# Patient Record
Sex: FEMALE | Race: WHITE | NOT HISPANIC OR LATINO | Employment: FULL TIME | URBAN - METROPOLITAN AREA
[De-identification: names, ages, dates, MRNs, and addresses within clinical notes are randomized per-mention and may not be internally consistent; named-entity substitution may affect disease eponyms.]

---

## 2024-03-05 ENCOUNTER — LAB REQUISITION (OUTPATIENT)
Dept: LAB | Facility: CLINIC | Age: 29
End: 2024-03-05

## 2024-03-05 DIAGNOSIS — Z12.4 ENCOUNTER FOR SCREENING FOR MALIGNANT NEOPLASM OF CERVIX: ICD-10-CM

## 2024-03-05 PROCEDURE — G0145 SCR C/V CYTO,THINLAYER,RESCR: HCPCS | Performed by: OBSTETRICS & GYNECOLOGY

## 2024-03-05 PROCEDURE — 87624 HPV HI-RISK TYP POOLED RSLT: CPT | Performed by: OBSTETRICS & GYNECOLOGY

## 2024-03-07 ENCOUNTER — HOSPITAL ENCOUNTER (OUTPATIENT)
Facility: AMBULATORY SURGERY CENTER | Age: 29
End: 2024-03-07
Attending: OBSTETRICS & GYNECOLOGY
Payer: COMMERCIAL

## 2024-03-07 LAB
BKR LAB AP GYN ADEQUACY: NORMAL
BKR LAB AP GYN INTERPRETATION: NORMAL
BKR LAB AP HPV REFLEX: NORMAL
BKR LAB AP LMP: NORMAL
BKR LAB AP PREVIOUS ABNL DX: NORMAL
BKR LAB AP PREVIOUS ABNORMAL: NORMAL
PATH REPORT.COMMENTS IMP SPEC: NORMAL
PATH REPORT.COMMENTS IMP SPEC: NORMAL
PATH REPORT.RELEVANT HX SPEC: NORMAL

## 2024-03-11 LAB
HUMAN PAPILLOMA VIRUS 16 DNA: NEGATIVE
HUMAN PAPILLOMA VIRUS 18 DNA: NEGATIVE
HUMAN PAPILLOMA VIRUS FINAL DIAGNOSIS: NORMAL
HUMAN PAPILLOMA VIRUS OTHER HR: NEGATIVE

## 2024-03-13 RX ORDER — ACETAMINOPHEN 325 MG/1
975 TABLET ORAL ONCE
Status: CANCELLED | OUTPATIENT
Start: 2024-03-13 | End: 2024-03-13

## 2024-04-30 RX ORDER — LIDOCAINE 40 MG/G
CREAM TOPICAL
Status: CANCELLED | OUTPATIENT
Start: 2024-04-30

## 2024-04-30 RX ORDER — SODIUM CHLORIDE, SODIUM LACTATE, POTASSIUM CHLORIDE, CALCIUM CHLORIDE 600; 310; 30; 20 MG/100ML; MG/100ML; MG/100ML; MG/100ML
INJECTION, SOLUTION INTRAVENOUS CONTINUOUS
Status: CANCELLED | OUTPATIENT
Start: 2024-04-30

## 2024-04-30 RX ORDER — MAGNESIUM SULFATE HEPTAHYDRATE 40 MG/ML
4 INJECTION, SOLUTION INTRAVENOUS ONCE
Status: CANCELLED | OUTPATIENT
Start: 2024-04-30 | End: 2024-04-30

## 2024-04-30 RX ORDER — ACETAMINOPHEN 325 MG/1
975 TABLET ORAL ONCE
Status: CANCELLED | OUTPATIENT
Start: 2024-04-30 | End: 2024-04-30

## 2024-05-01 ENCOUNTER — HOSPITAL ENCOUNTER (OUTPATIENT)
Dept: ULTRASOUND IMAGING | Facility: HOSPITAL | Age: 29
Discharge: HOME | End: 2024-05-01
Payer: COMMERCIAL

## 2024-05-01 ENCOUNTER — OFFICE VISIT (OUTPATIENT)
Dept: FAMILY MEDICINE | Facility: CLINIC | Age: 29
End: 2024-05-01
Payer: COMMERCIAL

## 2024-05-01 VITALS
HEART RATE: 71 BPM | OXYGEN SATURATION: 97 % | DIASTOLIC BLOOD PRESSURE: 70 MMHG | SYSTOLIC BLOOD PRESSURE: 118 MMHG | WEIGHT: 158.2 LBS | RESPIRATION RATE: 16 BRPM | TEMPERATURE: 99.5 F | HEIGHT: 65 IN | BODY MASS INDEX: 26.36 KG/M2

## 2024-05-01 DIAGNOSIS — E05.90 SUBCLINICAL HYPERTHYROIDISM: ICD-10-CM

## 2024-05-01 DIAGNOSIS — Z01.818 PREOP GENERAL PHYSICAL EXAM: Primary | ICD-10-CM

## 2024-05-01 DIAGNOSIS — R22.1 MASS OF THYROID REGION: ICD-10-CM

## 2024-05-01 LAB
ERYTHROCYTE [DISTWIDTH] IN BLOOD BY AUTOMATED COUNT: 11.2 % (ref 10–15)
HCT VFR BLD AUTO: 39.7 % (ref 35–47)
HGB BLD-MCNC: 14.2 G/DL (ref 11.7–15.7)
MCH RBC QN AUTO: 31.7 PG (ref 26.5–33)
MCHC RBC AUTO-ENTMCNC: 35.8 G/DL (ref 31.5–36.5)
MCV RBC AUTO: 89 FL (ref 78–100)
PLATELET # BLD AUTO: 276 10E3/UL (ref 150–450)
RBC # BLD AUTO: 4.48 10E6/UL (ref 3.8–5.2)
WBC # BLD AUTO: 5.7 10E3/UL (ref 4–11)

## 2024-05-01 PROCEDURE — 76536 US EXAM OF HEAD AND NECK: CPT

## 2024-05-01 PROCEDURE — 80048 BASIC METABOLIC PNL TOTAL CA: CPT

## 2024-05-01 PROCEDURE — 99204 OFFICE O/P NEW MOD 45 MIN: CPT

## 2024-05-01 PROCEDURE — 84443 ASSAY THYROID STIM HORMONE: CPT

## 2024-05-01 PROCEDURE — 84480 ASSAY TRIIODOTHYRONINE (T3): CPT

## 2024-05-01 PROCEDURE — 84439 ASSAY OF FREE THYROXINE: CPT

## 2024-05-01 PROCEDURE — 85027 COMPLETE CBC AUTOMATED: CPT

## 2024-05-01 PROCEDURE — 36415 COLL VENOUS BLD VENIPUNCTURE: CPT

## 2024-05-01 RX ORDER — CETIRIZINE HYDROCHLORIDE 5 MG/1
10 TABLET ORAL DAILY
COMMUNITY

## 2024-05-01 ASSESSMENT — PAIN SCALES - GENERAL: PAINLEVEL: NO PAIN (0)

## 2024-05-01 NOTE — PROGRESS NOTES
Preoperative Evaluation  [address] [address]  Phone: [phone]  Fax: [phone]  Primary Provider: No Ref-Primary, Physician  Pre-op Performing Provider: JULY SHIELDS  May 1, 2024     Kezia is a 29 year old, presenting for the following:  Pre-Op Exam (Pre-op exam today )        5/1/2024    11:10 AM   Additional Questions   Roomed by María Elena NGO MA   Accompanied by Self     Surgical Information  Surgery/Procedure: laparoscopic Bilateral Salpingectomy   Surgery Location: [facility]   Surgeon: Dr. Janay Plascencia   Surgery Date: 5/3/24  Time of Surgery: 7 am    Where patient plans to recover: At home with family  Fax number for surgical facility: Print copy for patient to bring to surgery. Patient was not given a fax number to where it needs to be faxed.     Assessment & Plan     The proposed surgical procedure is considered INTERMEDIATE risk.    Preop general physical exam  On exam, pleasant 29 year old patient. No known medical history. No findings on today's physical exam that would make me recommend holding off on the procedure. Vital signs at goal. Having bilateral salpingectomy. Has no questions regarding the procedure. Will check labs today. CBC and BMP without concern. TSH of 0.03, recommend holding off on procedure until euthyroid state.   - CBC with platelets  - Basic metabolic panel  (Ca, Cl, CO2, Creat, Gluc, K, Na, BUN)    Mass of thyroid region  On exam, there is a palpable mass to the left neck, in the thyroid region. Non-tender. Soft. Patient has not noticed this before. We will check TSH and get a US. US back with left thyroid mass that meets criteria for FNA. Ordered and notified patient. TSH came back at 0.03. Not approved for surgery at this time until further work-up.   - US Thyroid; Future  - TSH with free T4 reflex  - US Biopsy Thyroid Fine Needle Aspiration; Future     - No identified additional risk  factors other than previously addressed    Antiplatelet or Anticoagulation Medication Instructions   - Patient is on no antiplatelet or anticoagulation medications.    Additional Medication Instructions  Hold Cetrizine until after the procedure.    Recommendation  Not approved for surgery. Recommend getting patient to euthyroid state prior to anesthesia.     Subjective     HPI related to upcoming procedure: Does not want to have any children, salpingectomy has been discussed as contraceptive option which patient would like to do.  Has met with OBGYN regarding procedure.          5/1/2024    11:08 AM   Preop Questions   1. Have you ever had a heart attack or stroke? No   2. Have you ever had surgery on your heart or blood vessels, such as a stent placement, a coronary artery bypass, or surgery on an artery in your head, neck, heart, or legs? No   3. Do you have chest pain with activity? No   4. Do you have a history of  heart failure? No   5. Do you currently have a cold, bronchitis or symptoms of other infection? No   6. Do you have a cough, shortness of breath, or wheezing? No   7. Do you or anyone in your family have previous history of blood clots? No   8. Do you or does anyone in your family have a serious bleeding problem such as prolonged bleeding following surgeries or cuts? No   9. Have you ever had problems with anemia or been told to take iron pills? No   10. Have you had any abnormal blood loss such as black, tarry or bloody stools, or abnormal vaginal bleeding? No   11. Have you ever had a blood transfusion? No   12. Are you willing to have a blood transfusion if it is medically needed before, during, or after your surgery? Yes   13. Have you or any of your relatives ever had problems with anesthesia? No   14. Do you have sleep apnea, excessive snoring or daytime drowsiness? No   15. Do you have any artifical heart valves or other implanted medical devices like a pacemaker, defibrillator, or continuous  glucose monitor? No   16. Do you have artificial joints? No   17. Are you allergic to latex? No   18. Is there any chance that you may be pregnant? No       Health Care Directive  Patient does not have a Health Care Directive or Living Will: Discussed advance care planning with patient; however, patient declined at this time.    Preoperative Review of    reviewed - no record of controlled substances prescribed.      There are no problems to display for this patient.     No past medical history on file.  No past surgical history on file.  Current Outpatient Medications   Medication Sig Dispense Refill    cetirizine (ZYRTEC) 5 MG tablet Take 10 mg by mouth daily         Allergies   Allergen Reactions    Aspirin Other (See Comments)     stiffiness    Ibuprofen Other (See Comments)     stuffy        Social History     Tobacco Use    Smoking status: Never    Smokeless tobacco: Never   Substance Use Topics    Alcohol use: Not on file     Family History   Problem Relation Age of Onset    Anesthesia Reaction No family hx of     Thrombosis No family hx of      History   Drug Use Not on file         Review of Systems    Review of Systems  CONSTITUTIONAL: NEGATIVE for fever, chills, change in weight  INTEGUMENTARY/SKIN: NEGATIVE for worrisome rashes, moles or lesions  EYES: NEGATIVE for vision changes or irritation  ENT/MOUTH: NEGATIVE for ear, mouth and throat problems  RESP: NEGATIVE for significant cough or SOB  CV: NEGATIVE for chest pain, palpitations or peripheral edema  GI: NEGATIVE for nausea, abdominal pain, heartburn, or change in bowel habits  : NEGATIVE for frequency, dysuria, or hematuria  MUSCULOSKELETAL: NEGATIVE for significant arthralgias or myalgia  NEURO: NEGATIVE for weakness, dizziness or paresthesias  ENDOCRINE: NEGATIVE for temperature intolerance, skin/hair changes  HEME: NEGATIVE for bleeding problems  PSYCHIATRIC: NEGATIVE for changes in mood or affect    Objective    /70 (BP Location:  "Right arm, Patient Position: Sitting, Cuff Size: Adult Regular)   Pulse 71   Temp 99.5  F (37.5  C) (Oral)   Resp 16   Ht 1.651 m (5' 5\")   Wt 71.8 kg (158 lb 3.2 oz)   LMP  (LMP Unknown)   SpO2 97%   BMI 26.33 kg/m     Estimated body mass index is 26.33 kg/m  as calculated from the following:    Height as of this encounter: 1.651 m (5' 5\").    Weight as of this encounter: 71.8 kg (158 lb 3.2 oz).  Physical Exam  Constitutional:       General: She is not in acute distress.  HENT:      Right Ear: Tympanic membrane, ear canal and external ear normal.      Left Ear: Tympanic membrane, ear canal and external ear normal.   Eyes:      Extraocular Movements: Extraocular movements intact.      Pupils: Pupils are equal, round, and reactive to light.   Neck:      Thyroid: Thyroid mass present.     Cardiovascular:      Rate and Rhythm: Normal rate and regular rhythm.      Pulses: Normal pulses.      Heart sounds: Normal heart sounds. No murmur heard.  Pulmonary:      Effort: Pulmonary effort is normal. No respiratory distress.      Breath sounds: No wheezing.   Abdominal:      General: Abdomen is flat. Bowel sounds are normal. There is no distension.   Musculoskeletal:         General: Normal range of motion.      Cervical back: Normal range of motion. No rigidity.      Right lower leg: No edema.      Left lower leg: No edema.   Lymphadenopathy:      Cervical: No cervical adenopathy.   Neurological:      General: No focal deficit present.      Mental Status: She is alert.      Cranial Nerves: No cranial nerve deficit.      Sensory: No sensory deficit.      Motor: No weakness.      Gait: Gait normal.   Psychiatric:         Mood and Affect: Mood normal.         Thought Content: Thought content normal.         Diagnostics  Recent Results (from the past 24 hour(s))   CBC with platelets    Collection Time: 05/01/24 12:10 PM   Result Value Ref Range    WBC Count 5.7 4.0 - 11.0 10e3/uL    RBC Count 4.48 3.80 - 5.20 10e6/uL    " Hemoglobin 14.2 11.7 - 15.7 g/dL    Hematocrit 39.7 35.0 - 47.0 %    MCV 89 78 - 100 fL    MCH 31.7 26.5 - 33.0 pg    MCHC 35.8 31.5 - 36.5 g/dL    RDW 11.2 10.0 - 15.0 %    Platelet Count 276 150 - 450 10e3/uL      No EKG required, no history of coronary heart disease, significant arrhythmia, peripheral arterial disease or other structural heart disease.    Revised Cardiac Risk Index (RCRI)  The patient has the following serious cardiovascular risks for perioperative complications:   - No serious cardiac risks = 0 points     RCRI Interpretation: 0 points: Class I (very low risk - 0.4% complication rate)    At the end of the visit, I confirmed understanding of what was discussed. Kezia has no further questions or concerns that were brought up at this time.     Beena Morrissey DNP, APRN, FNP-C

## 2024-05-01 NOTE — PATIENT INSTRUCTIONS
Preparing for Your Surgery  Getting started  A nurse will call you to review your health history and instructions. They will give you an arrival time based on your scheduled surgery time. Please be ready to share:  Your doctor's clinic name and phone number  Your medical, surgical, and anesthesia history  A list of allergies and sensitivities  A list of medicines, including herbal treatments and over-the-counter drugs  Whether the patient has a legal guardian (ask how to send us the papers in advance)  Please tell us if you're pregnant--or if there's any chance you might be pregnant. Some surgeries may injure a fetus (unborn baby), so they require a pregnancy test. Surgeries that are safe for a fetus don't always need a test, and you can choose whether to have one.   If you have a child who's having surgery, please ask for a copy of Preparing for Your Child's Surgery.    Preparing for surgery  Within 10 to 30 days of surgery: Have a pre-op exam (sometimes called an H&P, or History and Physical). This can be done at a clinic or pre-operative center.  If you're having a , you may not need this exam. Talk to your care team.  At your pre-op exam, talk to your care team about all medicines you take. If you need to stop any medicines before surgery, ask when to start taking them again.  We do this for your safety. Many medicines can make you bleed too much during surgery. Some change how well surgery (anesthesia) drugs work.  Call your insurance company to let them know you're having surgery. (If you don't have insurance, call [phone].)  Call your clinic if there's any change in your health. This includes signs of a cold or flu (sore throat, runny nose, cough, rash, fever). It also includes a scrape or scratch near the surgery site.  If you have questions on the day of surgery, call your hospital or surgery center.  Eating and drinking guidelines  For your safety: Unless your surgeon tells you otherwise,  follow the guidelines below.  Eat and drink as usual until 8 hours before you arrive for surgery. After that, no food or milk.  Drink clear liquids until 2 hours before you arrive. These are liquids you can see through, like water, Gatorade, and Propel Water. They also include plain black coffee and tea (no cream or milk), candy, and breath mints. You can spit out gum when you arrive.  If you drink alcohol: Stop drinking it the night before surgery.  If your care team tells you to take medicine on the morning of surgery, it's okay to take it with a sip of water.  Preventing infection  Shower or bathe the night before and morning of your surgery. Follow the instructions your clinic gave you. (If no instructions, use regular soap.)  Don't shave or clip hair near your surgery site. We'll remove the hair if needed.  Don't smoke or vape the morning of surgery. You may chew nicotine gum up to 2 hours before surgery. A nicotine patch is okay.  Note: Some surgeries require you to completely quit smoking and nicotine. Check with your surgeon.  Your care team will make every effort to keep you safe from infection. We will:  Clean our hands often with soap and water (or an alcohol-based hand rub).  Clean the skin at your surgery site with a special soap that kills germs.  Give you a special gown to keep you warm. (Cold raises the risk of infection.)  Wear special hair covers, masks, gowns and gloves during surgery.  Give antibiotic medicine, if prescribed. Not all surgeries need antibiotics.  What to bring on the day of surgery  Photo ID and insurance card  Copy of your health care directive, if you have one  Glasses and hearing aids (bring cases)  You can't wear contacts during surgery  Inhaler and eye drops, if you use them (tell us about these when you arrive)  CPAP machine or breathing device, if you use them  A few personal items, if spending the night  If you have . . .  A pacemaker, ICD (cardiac defibrillator) or other  implant: Bring the ID card.  An implanted stimulator: Bring the remote control.  A legal guardian: Bring a copy of the certified (court-stamped) guardianship papers.  Please remove any jewelry, including body piercings. Leave jewelry and other valuables at home.  If you're going home the day of surgery  You must have a responsible adult drive you home. They should stay with you overnight as well.  If you don't have someone to stay with you, and you aren't safe to go home alone, we may keep you overnight. Insurance often won't pay for this.  After surgery  If it's hard to control your pain or you need more pain medicine, please call your surgeon's office.  Questions?   If you have any questions for your care team, list them here: _________________________________________________________________________________________________________________________________________________________________________ ____________________________________ ____________________________________ ____________________________________  For informational purposes only. Not to replace the advice of your health care provider. Copyright   2003, 2019 [employer]. All rights reserved. Clinically reviewed by Vero Andrew MD. [employer] 225107 - REV 12/22.    How to Take Your Medication Before Surgery  You can wait to take your cetrizine until after the procedure.

## 2024-05-02 ENCOUNTER — TELEPHONE (OUTPATIENT)
Dept: FAMILY MEDICINE | Facility: CLINIC | Age: 29
End: 2024-05-02
Payer: COMMERCIAL

## 2024-05-02 LAB
ANION GAP SERPL CALCULATED.3IONS-SCNC: 11 MMOL/L (ref 7–15)
BUN SERPL-MCNC: 11 MG/DL (ref 6–20)
CALCIUM SERPL-MCNC: 9.4 MG/DL (ref 8.6–10)
CHLORIDE SERPL-SCNC: 107 MMOL/L (ref 98–107)
CREAT SERPL-MCNC: 0.66 MG/DL (ref 0.51–0.95)
DEPRECATED HCO3 PLAS-SCNC: 24 MMOL/L (ref 22–29)
EGFRCR SERPLBLD CKD-EPI 2021: >90 ML/MIN/1.73M2
GLUCOSE SERPL-MCNC: 84 MG/DL (ref 70–99)
POTASSIUM SERPL-SCNC: 4 MMOL/L (ref 3.4–5.3)
SODIUM SERPL-SCNC: 142 MMOL/L (ref 135–145)
T4 FREE SERPL-MCNC: 1.32 NG/DL (ref 0.9–1.7)
TSH SERPL DL<=0.005 MIU/L-ACNC: 0.03 UIU/ML (ref 0.3–4.2)

## 2024-05-03 DIAGNOSIS — Z30.9 ENCOUNTER FOR CONTRACEPTIVE MANAGEMENT, UNSPECIFIED TYPE: ICD-10-CM

## 2024-05-03 DIAGNOSIS — E05.90 SUBCLINICAL HYPERTHYROIDISM: Primary | ICD-10-CM

## 2024-05-03 PROCEDURE — 99207 E-CONSULT TO ENDOCRINOLOGY (ADULT OUTPT PROVIDER TO SPECIALIST WRITTEN QUESTION & RESPONSE): CPT

## 2024-05-03 RX ORDER — MEDROXYPROGESTERONE ACETATE 150 MG/ML
150 INJECTION, SUSPENSION INTRAMUSCULAR ONCE
Status: COMPLETED | OUTPATIENT
Start: 2024-05-03 | End: 2024-05-06

## 2024-05-03 NOTE — PROGRESS NOTES
Can we call patient and get her an MA appointment on Monday for Depo-Provera. [facility] and [facility] is due for next dose from May 1-8th. Please ask her to bring records of last dose on Monday.

## 2024-05-03 NOTE — TELEPHONE ENCOUNTER
Called and notified patient that I am not approving for surgery at this time. Patient verbalized understanding. I will reach out tomorrow with plan for the subclinical hyperthyroidism, patient saw message regarding FNA order.

## 2024-05-03 NOTE — TELEPHONE ENCOUNTER
"Joe calling to ask what the \"next steps\" are for this pt as the procedure scheduled for 5/3 was cancelled. Joe would like to ask provider what the next plan would be for the patient and would like a call back ASAP today. Joe can be reached at [phone].     Writer informed would route message to provider.   "

## 2024-05-04 ENCOUNTER — E-CONSULT (OUTPATIENT)
Dept: ENDOCRINOLOGY | Facility: CLINIC | Age: 29
End: 2024-05-04
Payer: COMMERCIAL

## 2024-05-04 DIAGNOSIS — E03.8 SUBCLINICAL HYPOTHYROIDISM: Primary | ICD-10-CM

## 2024-05-04 LAB — T3 SERPL-MCNC: 158 NG/DL (ref 85–202)

## 2024-05-04 PROCEDURE — 99451 NTRPROF PH1/NTRNET/EHR 5/>: CPT

## 2024-05-04 NOTE — PROGRESS NOTES
5/4/2024     E-Consult has been accepted.    Interprofessional consultation requested by:  Sparkle Morrissey APRN CNP      Clinical Question/Purpose: MY CLINICAL QUESTION IS: Thyroid nodule found on physical exam, FNA ordered at this time. TSH low, normal T4. Asymptomatic. Plan is to wait for FNA and refer to endocrinology. Patient is wanting to do elective salpingectomy. Would you recommend holding off on OR until work-up is completed with the TSH being low, or could it be scheduled at this time.     Patient assessment and information reviewed:     5/1/24 TSH 0.03, free T4 1.32     5/1/24 thyroid US:   Right lobe small  Left thyroid nodule 2.5 x 1.8 x 4.4 75% solid/mixed cystic ; grade 3 doppler     Current Outpatient Medications   Medication Sig Dispense Refill    cetirizine (ZYRTEC) 5 MG tablet Take 10 mg by mouth daily         Impression:   Low TSH with normal free T4 . Differential subclinical thyrotoxicosis (including thyroiditis or hyperthyroidism from toxic adenoma, Graves) vs illness vs interference  Left thyroid nodule 4.4 cm , mixed.       Recommendations:   Try to add on total T3 (not free T3)  and thyroid stimulating immunoglobulin (TSI)  to the 5/1/24 blood sample  Repeat labs in 2 weeks: TSH, free T4, total T3 (and TSI if you can't get it added on to the 5/1 sample) . Let me know if results are abnormal then.   Agree with plan for FNAB of left thyroid nodule .  Make sure she is not taking OTC supplements , especially biotin, for one week prior to the blood draw  She should not have elective surgery with question of thyrotoxicosis and until thyroid labs are normalized.     The recommendations provided in this E-Consult are based on a review of clinical data pertinent to the clinical question presented, without a review of the patient's complete medical record or, the benefit of a comprehensive in-person or virtual patient evaluation. This consultation should not replace the clinical judgement and  evaluation of the provider ordering this E-Consult. Any new clinical issues, or changes in patient status since the filing of this E-Consult will need to be taken into account when assessing these recommendations. Please contact me if you have further questions.    My total time spent reviewing clinical information and formulating assessment was 5 minutes.    Addendum:   5/21/24 TSH 0.03, free T4 1.27, T3 155, TSI < 1  5/21/24 FNAB left thyroid nodule benign   6/4/24 123I thyroid uptake scan: hot nodule left; complete suppression of rest of thyroid.  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     Recommend endocrine referral to discuss treatment options.      Lisa Kelly MD

## 2024-05-06 ENCOUNTER — ALLIED HEALTH/NURSE VISIT (OUTPATIENT)
Dept: FAMILY MEDICINE | Facility: CLINIC | Age: 29
End: 2024-05-06
Payer: COMMERCIAL

## 2024-05-06 DIAGNOSIS — Z30.9 ENCOUNTER FOR CONTRACEPTIVE MANAGEMENT, UNSPECIFIED TYPE: Primary | ICD-10-CM

## 2024-05-06 PROCEDURE — 99207 PR NO CHARGE NURSE ONLY: CPT

## 2024-05-06 PROCEDURE — 96372 THER/PROPH/DIAG INJ SC/IM: CPT

## 2024-05-06 RX ADMIN — MEDROXYPROGESTERONE ACETATE 150 MG: 150 INJECTION, SUSPENSION INTRAMUSCULAR at 11:22

## 2024-05-19 ENCOUNTER — HEALTH MAINTENANCE LETTER (OUTPATIENT)
Age: 29
End: 2024-05-19

## 2024-05-21 ENCOUNTER — LAB (OUTPATIENT)
Dept: LAB | Facility: CLINIC | Age: 29
End: 2024-05-21
Payer: COMMERCIAL

## 2024-05-21 ENCOUNTER — HOSPITAL ENCOUNTER (OUTPATIENT)
Dept: ULTRASOUND IMAGING | Facility: CLINIC | Age: 29
Discharge: HOME | End: 2024-05-21
Payer: COMMERCIAL

## 2024-05-21 DIAGNOSIS — E03.8 SUBCLINICAL HYPOTHYROIDISM: ICD-10-CM

## 2024-05-21 DIAGNOSIS — R22.1 MASS OF THYROID REGION: ICD-10-CM

## 2024-05-21 PROCEDURE — 36415 COLL VENOUS BLD VENIPUNCTURE: CPT

## 2024-05-21 PROCEDURE — 84443 ASSAY THYROID STIM HORMONE: CPT

## 2024-05-21 PROCEDURE — 88173 CYTOPATH EVAL FNA REPORT: CPT | Mod: TC

## 2024-05-21 PROCEDURE — 10005 FNA BX W/US GDN 1ST LES: CPT

## 2024-05-21 PROCEDURE — 84445 ASSAY OF TSI GLOBULIN: CPT | Mod: 90

## 2024-05-21 PROCEDURE — 88172 CYTP DX EVAL FNA 1ST EA SITE: CPT | Mod: 26 | Performed by: PATHOLOGY

## 2024-05-21 PROCEDURE — 84439 ASSAY OF FREE THYROXINE: CPT

## 2024-05-21 PROCEDURE — 88173 CYTOPATH EVAL FNA REPORT: CPT | Mod: 26 | Performed by: PATHOLOGY

## 2024-05-21 PROCEDURE — 99000 SPECIMEN HANDLING OFFICE-LAB: CPT

## 2024-05-21 PROCEDURE — 84480 ASSAY TRIIODOTHYRONINE (T3): CPT

## 2024-05-22 LAB
PATH REPORT.COMMENTS IMP SPEC: NORMAL
PATH REPORT.FINAL DX SPEC: NORMAL
PATH REPORT.GROSS SPEC: NORMAL
T3 SERPL-MCNC: 155 NG/DL (ref 85–202)
T4 FREE SERPL-MCNC: 1.27 NG/DL (ref 0.9–1.7)
TSH SERPL DL<=0.005 MIU/L-ACNC: 0.03 UIU/ML (ref 0.3–4.2)

## 2024-05-28 LAB — TSI SER-ACNC: <1 TSI INDEX

## 2024-06-03 ENCOUNTER — HOSPITAL ENCOUNTER (OUTPATIENT)
Dept: NUCLEAR MEDICINE | Facility: HOSPITAL | Age: 29
Discharge: HOME | End: 2024-06-03
Payer: COMMERCIAL

## 2024-06-03 DIAGNOSIS — E03.8 SUBCLINICAL HYPOTHYROIDISM: ICD-10-CM

## 2024-06-03 PROCEDURE — A9516 IODINE I-123 SOD IODIDE MIC: HCPCS

## 2024-06-03 PROCEDURE — 78014 THYROID IMAGING W/BLOOD FLOW: CPT

## 2024-06-03 PROCEDURE — 343N000001 HC RX 343

## 2024-06-03 RX ADMIN — Medication 228 MICROCURIE: at 09:14

## 2024-06-04 ENCOUNTER — HOSPITAL ENCOUNTER (OUTPATIENT)
Dept: NUCLEAR MEDICINE | Facility: HOSPITAL | Age: 29
Discharge: HOME | End: 2024-06-04
Payer: COMMERCIAL

## 2024-06-04 DIAGNOSIS — E05.10 TOXIC THYROID NODULE: Primary | ICD-10-CM

## 2024-06-10 NOTE — TELEPHONE ENCOUNTER
DX, Referring NOTES: Toxic Thyroid Nodule    For Cancer Patients: Need the original operative and surgical pathology reports and all imaging reports/images related to the disease (includes all thyroid US, nuclear thyroid and total body scans, PET scans, chest CT reports since prior to the diagnosis ).   APPT DATE: 6/26/2024   NOTES (FOR ALL VISITS) STATUS DETAILS   OFFICE NOTES from referring provider [facility]:  5/1/24 - [facility] OV with Sparkle Morrissey NP   OFFICE NOTES from other specialist Care Everywhere / Internal [facility]:  5/4/24 - ENDO EV with Dr. Leticia Choi:  1/12/24 - UC OV with Silvia Gold NP   ED NOTES N/A    OPERATIVE REPORT  (thyroid, pituitary, adrenal, parathyroid)  (All op notes related to diagnoses) N/A    MEDICATION LIST Internal    IMAGING      NUCLEAR [facility]:  6/3/24 - NM Thyroid   ULTRASOUND (HEAD/NECK)  * Include FNAs [facility]:  5/21/24 - US Thyroid FNA  5/1/24 - US Thyroid   LABS     DIABETES: HBGA1C, CREATININE, FASTING LIPIDS, MICROALBUMIN URINE, POTASSIUM, TSH, T4    THYROID: TSH, T4, CBC, THYRODLONULIN, TOTAL T3, FREE T4, CALCITONIN, CEA [facility]:  5/21/24 - TSH, T4, T3  5/1/24 - BMP  5/1/24 - CBC   PATHOLOGY REPORTS WITH CASE NUMBER  *Surgical path reports for endocrine organs (ovaries, testes, pancreas, etc) Internal   [facility]:  5/21/24 - Thyroid FNA (Case: [id number])

## 2024-06-26 ENCOUNTER — PRE VISIT (OUTPATIENT)
Dept: ENDOCRINOLOGY | Facility: CLINIC | Age: 29
End: 2024-06-26

## 2024-06-26 ENCOUNTER — OFFICE VISIT (OUTPATIENT)
Dept: ENDOCRINOLOGY | Facility: CLINIC | Age: 29
End: 2024-06-26
Payer: COMMERCIAL

## 2024-06-26 ENCOUNTER — LAB (OUTPATIENT)
Dept: LAB | Facility: CLINIC | Age: 29
End: 2024-06-26
Payer: COMMERCIAL

## 2024-06-26 VITALS
BODY MASS INDEX: 26.82 KG/M2 | WEIGHT: 161 LBS | HEIGHT: 65 IN | OXYGEN SATURATION: 100 % | SYSTOLIC BLOOD PRESSURE: 122 MMHG | HEART RATE: 88 BPM | DIASTOLIC BLOOD PRESSURE: 79 MMHG

## 2024-06-26 DIAGNOSIS — E05.10 TOXIC THYROID NODULE: ICD-10-CM

## 2024-06-26 DIAGNOSIS — E05.10 THYROTOXICOSIS WITH TOXIC SINGLE THYROID NODULE AND WITHOUT THYROID STORM: ICD-10-CM

## 2024-06-26 DIAGNOSIS — E05.10 THYROTOXICOSIS WITH TOXIC SINGLE THYROID NODULE AND WITHOUT THYROID STORM: Primary | ICD-10-CM

## 2024-06-26 LAB
T4 FREE SERPL-MCNC: 1 NG/DL (ref 0.9–1.7)
TSH SERPL DL<=0.005 MIU/L-ACNC: 0.29 UIU/ML (ref 0.3–4.2)

## 2024-06-26 PROCEDURE — 99000 SPECIMEN HANDLING OFFICE-LAB: CPT | Performed by: PATHOLOGY

## 2024-06-26 PROCEDURE — 36415 COLL VENOUS BLD VENIPUNCTURE: CPT | Performed by: PATHOLOGY

## 2024-06-26 PROCEDURE — 84439 ASSAY OF FREE THYROXINE: CPT | Performed by: PATHOLOGY

## 2024-06-26 PROCEDURE — 84443 ASSAY THYROID STIM HORMONE: CPT | Performed by: PATHOLOGY

## 2024-06-26 PROCEDURE — 99417 PROLNG OP E/M EACH 15 MIN: CPT

## 2024-06-26 PROCEDURE — 84480 ASSAY TRIIODOTHYRONINE (T3): CPT

## 2024-06-26 PROCEDURE — 99205 OFFICE O/P NEW HI 60 MIN: CPT

## 2024-06-26 ASSESSMENT — PAIN SCALES - GENERAL: PAINLEVEL: NO PAIN (0)

## 2024-06-26 NOTE — PROGRESS NOTES
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  I have reviewed pros and cons of the different approaches as it pertains to the thyrotoxicosis and also related to risk for future hypothyroidism and impact on the thyroid nodule itself. 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 We would start ATD preop if we did this.    She didn't arrive at a decision .       Recent left thyroid increased swelling, tenderness and radiation to the left ear.    Repeat TFTS now because of this - was this thyroiditis ? Did she bleed into cystic area?     Addendum  6/26/24 TSh 0.29, free T4 1.0  7/25/24 TSH 0.19, free T4 1.08, T3 128   8/15/24 surgical referral; Methimazole 5 mg/day   9/14/24 TSH 1.19, free T4 0.95, T3 127     78__ minutes spent on the date of the encounter doing chart review, history and exam, documentation and further activities as noted above.     Lisa Kelly MD    Chief complaint:  Kezia is a 29 year old female seen in consultation at the request of Sparkle HANKINS  I have reviewed Care Everywhere including [facility], [facility] lab reports, imaging reports and provider notes as indicated.      HISTORY OF PRESENT ILLNESS    Thyroid labs were checked during a preoperative exam for laparoscopic bilateral salpingectomy.  She was noted to have left thyroid mass on exam.  Since then, she has had FNAB and thyroid uptake/scan.    Following the thyroid scan she had left thyroid increased swelling, tenderness with radiation to the ear.      Endocrine relevant labs are as follows:  5/1/24 TSH 0.03, free T4 1.32   5/21/24 TSH 0.03, free T4 1.27, T3 155, TSI < 1     Relevant imaging is as follows: (as read by me as it pertains to  "endocrine relevant organs)  24 thyroid US:   Right lobe small  Left thyroid nodule 2.5 x 1.8 x 4.4 75% solid/mixed cystic ; grade 3 doppler   24 123I thyroid uptake scan: hot nodule left; complete suppression of rest of thyroid.  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       REVIEW OF SYSTEMS  Some fatigue  Sleep OK   Weight stable  Cardiac: occasional CP - last weeks ago  Respiratory: negative  GI: negative   Occasional nausea  Headaches - migraines on occasion  No periods on Depo  10 system ROS otherwise as per the HPI or negative    Past Medical History  Past Medical History:   Diagnosis Date    Allergic rhinosinusitis     Gastroesophageal reflux disease     Subclinical hyperthyroidism 2024    Thyroid nodule 2024    toxic adenoma left 4.4 cm     No past surgical history on file.    Medications  Current Outpatient Medications   Medication Sig Dispense Refill    cetirizine (ZYRTEC) 5 MG tablet Take 10 mg by mouth daily       Allergies  Allergies   Allergen Reactions    Aspirin Other (See Comments)     stiffiness    Ibuprofen Other (See Comments)     stuffy     Family History  Family History   Problem Relation Age of Onset    Brain Cancer Paternal Grandfather     Anesthesia Reaction No family hx of     Thrombosis No family hx of     Thyroid Disease No family hx of     Nephrolithiasis No family hx of     Diabetes No family hx of      Social History  Social History     Tobacco Use    Smoking status: Former     Current packs/day: 0.00     Types: Cigarettes     Quit date: 2016     Years since quittin.4    Smokeless tobacco: Current    Tobacco comments:     Occ vape with THC   Vaping Use    Vaping status: Never Used   Substance Use Topics    Alcohol use: Yes     Comment: rarely    Drug use: Yes     Types: Marijuana   Works as a      EXAM pleasant young woman in NAD wearing  N94 mask  /79   Pulse 88   Ht 1.651 m (5' 5\")   " Wt 73 kg (161 lb)   SpO2 100%   BMI 26.79 kg/m    SKIN: normal color, warm temperature, texture   HEENT: PER, no scleral icterus, eyelid retraction, stare, proptosis or conjunctival injection.  .    NECK: visible and palpable 4 cm left thyroid mass ; no cervical adenopathy   LUNGS: clear to auscultation bilaterally.   CARDIAC: RRR, S1, S2 without murmurs, rubs or gallops.    BACK: normal spinal contour.    NEURO: Alert, responds appropriately to questions,  moves all extremities, DTRs 3/4, gait normal, +/- tremor of the outstretched hand    DATA REVIEW    Cytology, non-gynecologic: [id number]  Order: [id number]  Collected 5/21/2024 12:04 PM    Status: Final result    Visible to patient: Yes (seen)    Dx: Mass of thyroid region    1 Result Note    1 Patient Communication       Component  Resulting Agency   Final Diagnosis   Specimen               Interpretation:              Benign ([place] II), Consistent with follicular nodular disease (includes adenomatoid nodule, colloid nodule, etc.) (benign)  A. Thyroid, left:  - Consistent with benign colloid nodule              Adequacy:              Satisfactory for evaluation   Electronically signed by Bronson Green MD on 5/22/2024 at 10:21 AM   Comment   Laboratory   The clinical history has been reviewed.   Rapid Onsite Evaluation  [facility]   FNA Performance:   Fine needle aspiration was not performed by [facility] staff.  Aspirate immediate study/adequacy:  I, BRONSON GEREN MD, attest that I immediately examined smears while the procedure was underway and determined or confirmed the adequacy of the specimens.  It is of note that the final assessment and report may be performed and signed by a different pathologist.  Onsite adequacy/interpretation:  A: Adequate   Gross Description  [facility]   A(A). Thyroid, left, :A. Thyroid, left, , Fine Needle Aspirate:  Received are 4 fixed slides, processed for Pap stain, and 4 air dried slides, processed for Diff  Quik stain.  Afirma tube held.   Performing Labs  [facility]   The technical component of this testing was completed at [facility].   Stain controls for all stains resulted within this report have been reviewed and show appropriate reactivity.               Specimen Collected: 05/21/24 12:04 PM Last Resulted: 05/22/24 10:21 AM        Narrative & Impression   EXAM: NM THYROID UPTAKE AND SCAN  LOCATION: [facility]  DATE: 6/4/2024  INDICATION: Subclinical hypothyroidism. Abnormal labs. Enlarged thyroid. Neck tenderness. Fatigue.  COMPARISON: Thyroid ultrasound 5/1/2024 reviewed.  TECHNIQUE: 228 uCi I-123, oral ingestion. 24-hour neck uptake and imaging.  FINDINGS: 24-hour uptake: 12.1% (Normal range: 10-30%). Scintigraphic images demonstrate moderate focal uptake in the left mid/upper thyroid corresponding to a dominant nodule on comparison ultrasound. Relative suppression of uptake elsewhere in the   thyroid gland.                                                           IMPRESSION:  Findings suggest dominant left toxic thyroid nodule.     EXAM: US THYROID  LOCATION: [facility]  DATE: 5/1/2024  INDICATION: Mass of thyroid region.  COMPARISON: None available.  TECHNIQUE: Thyroid ultrasound.   FINDINGS:  RIGHT lobe: Measures 3.3 x 1.0 x 0.9 cm. Homogeneous echotexture.  Isthmus: 1 mm.  LEFT lobe: Measures 5.3 x 2.9 x 2.1 cm. Homogeneous echotexture.  NECK: No cervical lymphadenopathy.  NODULES:  Nodule 1: There is 4.4 x 2.5 x 1.8 cm nodule occupying the majority of the left thyroid lobe.   Composition: Mixed cystic and solid, 1 point   Echogenicity: Hyperechoic or isoechoic, 1 point   Shape: Wider-than-tall, 0 points   Margin: Ill-defined, 0 points   Echogenic Foci: Punctate echoic foci, 3 points   Point Total: 4-6 points. TI-RADS 4. If 1.5 cm or larger, recommend FNA; if 1 cm or larger, follow up US  (annually for 5 years).                                                       IMPRESSION:  1.  There is 4.4 cm left thyroid nodule, which meets the criteria for FNA/tissue sampling. No right thyroid nodule visualized.  Nodules are characterized per  ACR Thyroid Imaging, Reporting and Data System (TI-RADS): White Paper of the ACR TI-RADS Committee  Ramon Brito et al. Journal of the American College of Radiology 2017. Volume 14 (2017), Issue 5, 587-330.

## 2024-06-26 NOTE — PATIENT INSTRUCTIONS
Options for Treatment of Hyperthyroidism in Toxic Adenoma    There are 3 options for treating hyperthyroidism.  The treatment method that is best for you depends on several factors, including your age, general health status, pregnancy status, convenience and preferences.      The options for treatment are listed below with advantages and disadvantages of each:    Medicine.      This requires taking a pill one to 3 times / day.  You will require monthly follow-up with me during the time you are taking the pills.  The pills will control the ability of the thyroid to make too much thyroid hormone and you will gradually improve.      Advantages:  This is an easy and effective form of therapy.    Disadvantages:  In most cases, the thyroid overactivity will return if you stop the pills.     Side-effects are very rare but can be serious, including agranulocytosis (or the loss of white blood cells that control infection).      The pills do not come in an intravenous form, which can make treatment very difficult if you are sick and unable to take oral medication.      Radioactive iodine    Since the thyroid uses iodine to make thyroid hormone, administration of appropriate doses of radioactive iodine will specifically target and destroy the thyroid, thereby treating the over-activity.     Procedure:  Before the treatment, it is necessary that we determine the function of your thyroid gland by performing a thyroid uptake test in the radiology department at the hospital.  This is a 2-day procedure.  Day 1 involves oral administration of a very small dose of radioactive iodine, sometimes followed by a picture of the thyroid 6 hours later.  On day 2 (24 hours after the dose was administered), you will lay under the camera, which will take a picture of your thyroid.  This will give us information about your thyroid s function, which we need in order to calculate your treatment dose of radioactive iodine.     The treatment dose of  radioactive iodine is delivered either later on the same day (day 2 of the uptake test), or at your earliest convenience.  The treatment is again an oral administration of radioactive iodine, but the dose is much higher than that used for the uptake test.  You will then gradually return to normal thyroid function over a period of weeks to months.      Advantages:  This is an easy and effective form of therapy.  Painless.      Disadvantages:  Treatment may results in permanent hypothyroidism (thyroid under-activity) which will require thyroid hormone replacement pills for the rest of your life).      Women of reproductive age must be documented to not be pregnant before the treatment.  We also recommend that you not attempt pregnancy for 6 months after the treatment.    Surgical removal of the involved thyroid gland.      Surgical removal of the involved half of the  thyroid gland will usually result in restoration of normal thyroid function of the suppressed thyroid.    Advantages:  Effective for treatment of hyperthyroidism.      Disadvantages: Risk of damage to the recurrent laryngeal nerve (which can lead to hoarseness); risk of damage to the parathyroid glands (which can lead to low calcium).  Anesthesia risks.  General surgical risks of bleeding, infection.        Information for patients on Tapazole or Propylthiouracil (PTU)  The generic name for Tapazole is methimazole.      The drugs, Tapazole and PTU are used to treat an overactive thyroid (hyperthyroidism).      They prevent the thyroid from making too much thyroid hormone.  You can think of them as putting up a road block in your thyroid.    These drugs are usually well tolerated and safe, but rarely can cause serious side effects.  The two worst potential side-effects are:  agranulocytosis.  This is the loss of the white blood cells that fight infection.  This can occur in approximately 1 in 200 people.  liver problems.  This is even more rare than  agranulocytosis but it can be very serious.    Because of the serious nature of the rare side-effects, you should notify your doctor if you develop the following symptoms while taking the drug:  Fever  sore throat  flu-like symptoms (nausea, vomiting, diarrhea, aches, abdominal pain)    In addition, anytime you have evidence of infection, you should get a blood test to be sure that you do not have agranulocytosis.  A prescription will be provided to you to get this blood test as needed.  This is an extra precaution and the results should be available the same day the blood test is drawn.  If your blood count is OK (which it almost always is), then you may continue to take the antithyroid drug.    While taking this medication, your doctor will probably want to see you frequently, every 1-2 months, at least for a time.  This is important so that the response can be monitored and the dose can be adjusted.      If you have concerns you may reach us at [phone] during regular hours, and [phone] (ask for endocrinologist on call) after hours.

## 2024-06-26 NOTE — LETTER
6/26/2024       RE: Kezia Loera  [address] [place] [address]     Dear Colleague,    Thank you for referring your patient, Keiza Loera, to the [facility]. Please see a copy of my visit note below.    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  I have reviewed pros and cons of the different approaches as it pertains to the thyrotoxicosis and also related to risk for future hypothyroidism and impact on the thyroid nodule itself. 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 We would start ATD preop if we did this.    She didn't arrive at a decision .       Recent left thyroid increased swelling, tenderness and radiation to the left ear.    Repeat TFTS now because of this - was this thyroiditis ? Did she bleed into cystic area?     78__ minutes spent on the date of the encounter doing chart review, history and exam, documentation and further activities as noted above.     Lisa Kelly MD    Chief complaint:  Kezia is a 29 year old female seen in consultation at the request of Sparkle HANKINS  I have reviewed Care Everywhere including [facility], [facility] lab reports, imaging reports and provider notes as indicated.      HISTORY OF PRESENT ILLNESS    Thyroid labs were checked during a preoperative exam for laparoscopic bilateral salpingectomy.  She was noted to have left thyroid mass on exam.  Since then, she has had FNAB and thyroid uptake/scan.    Following the thyroid scan she had left thyroid increased swelling, tenderness with radiation to the ear.      Endocrine relevant labs are as follows:  5/1/24  TSH 0.03, free T4 1.32   24 TSH 0.03, free T4 1.27, T3 155, TSI < 1     Relevant imaging is as follows: (as read by me as it pertains to endocrine relevant organs)  24 thyroid US:   Right lobe small  Left thyroid nodule 2.5 x 1.8 x 4.4 75% solid/mixed cystic ; grade 3 doppler   24 123I thyroid uptake scan: hot nodule left; complete suppression of rest of thyroid.  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       REVIEW OF SYSTEMS  Some fatigue  Sleep OK   Weight stable  Cardiac: occasional CP - last weeks ago  Respiratory: negative  GI: negative   Occasional nausea  Headaches - migraines on occasion  No periods on Depo  10 system ROS otherwise as per the HPI or negative    Past Medical History  Past Medical History:   Diagnosis Date    Allergic rhinosinusitis     Gastroesophageal reflux disease     Subclinical hyperthyroidism 2024    Thyroid nodule 2024    toxic adenoma left 4.4 cm     No past surgical history on file.    Medications  Current Outpatient Medications   Medication Sig Dispense Refill    cetirizine (ZYRTEC) 5 MG tablet Take 10 mg by mouth daily       Allergies  Allergies   Allergen Reactions    Aspirin Other (See Comments)     stiffiness    Ibuprofen Other (See Comments)     stuffy     Family History  Family History   Problem Relation Age of Onset    Brain Cancer Paternal Grandfather     Anesthesia Reaction No family hx of     Thrombosis No family hx of     Thyroid Disease No family hx of     Nephrolithiasis No family hx of     Diabetes No family hx of      Social History  Social History     Tobacco Use    Smoking status: Former     Current packs/day: 0.00     Types: Cigarettes     Quit date: 2016     Years since quittin.4    Smokeless tobacco: Current    Tobacco comments:     Occ vape with THC   Vaping Use    Vaping status: Never Used   Substance Use Topics    Alcohol use: Yes     Comment: rarely    Drug use: Yes      "Types: Marijuana   Works as a      EXAM pleasant young woman in NAD wearing  N94 mask  /79   Pulse 88   Ht 1.651 m (5' 5\")   Wt 73 kg (161 lb)   SpO2 100%   BMI 26.79 kg/m    SKIN: normal color, warm temperature, texture   HEENT: PER, no scleral icterus, eyelid retraction, stare, proptosis or conjunctival injection.  .    NECK: visible and palpable 4 cm left thyroid mass ; no cervical adenopathy   LUNGS: clear to auscultation bilaterally.   CARDIAC: RRR, S1, S2 without murmurs, rubs or gallops.    BACK: normal spinal contour.    NEURO: Alert, responds appropriately to questions,  moves all extremities, DTRs 3/4, gait normal, +/- tremor of the outstretched hand    DATA REVIEW    Cytology, non-gynecologic: [id number]  Order: [id number]  Collected 5/21/2024 12:04 PM    Status: Final result    Visible to patient: Yes (seen)    Dx: Mass of thyroid region    1 Result Note    1 Patient Communication       Component  Resulting Agency   Final Diagnosis   Specimen               Interpretation:              Benign ([place] II), Consistent with follicular nodular disease (includes adenomatoid nodule, colloid nodule, etc.) (benign)  A. Thyroid, left:  - Consistent with benign colloid nodule              Adequacy:              Satisfactory for evaluation   Electronically signed by Bronson Green MD on 5/22/2024 at 10:21 AM   Comment  [facility]   The clinical history has been reviewed.   Rapid Onsite Evaluation  [facility]   FNA Performance:   Fine needle aspiration was not performed by [facility] staff.  Aspirate immediate study/adequacy:  I, BRONSON GREEN MD, attest that I immediately examined smears while the procedure was underway and determined or confirmed the adequacy of the specimens.  It is of note that the final assessment and report may be performed and signed by a different pathologist.  Onsite adequacy/interpretation:  A: Adequate   Gross Description  [facility]   A(A). Thyroid, " left, :A. Thyroid, left, , Fine Needle Aspirate:  Received are 4 fixed slides, processed for Pap stain, and 4 air dried slides, processed for Diff Quik stain.  Afirma tube held.   Performing Labs  [facility]   The technical component of this testing was completed at [facility].   Stain controls for all stains resulted within this report have been reviewed and show appropriate reactivity.               Specimen Collected: 05/21/24 12:04 PM Last Resulted: 05/22/24 10:21 AM        Narrative & Impression   EXAM: NM THYROID UPTAKE AND SCAN  LOCATION: [facility]  DATE: 6/4/2024  INDICATION: Subclinical hypothyroidism. Abnormal labs. Enlarged thyroid. Neck tenderness. Fatigue.  COMPARISON: Thyroid ultrasound 5/1/2024 reviewed.  TECHNIQUE: 228 uCi I-123, oral ingestion. 24-hour neck uptake and imaging.  FINDINGS: 24-hour uptake: 12.1% (Normal range: 10-30%). Scintigraphic images demonstrate moderate focal uptake in the left mid/upper thyroid corresponding to a dominant nodule on comparison ultrasound. Relative suppression of uptake elsewhere in the   thyroid gland.                                                           IMPRESSION:  Findings suggest dominant left toxic thyroid nodule.     EXAM: US THYROID  LOCATION: [facility]  DATE: 5/1/2024  INDICATION: Mass of thyroid region.  COMPARISON: None available.  TECHNIQUE: Thyroid ultrasound.   FINDINGS:  RIGHT lobe: Measures 3.3 x 1.0 x 0.9 cm. Homogeneous echotexture.  Isthmus: 1 mm.  LEFT lobe: Measures 5.3 x 2.9 x 2.1 cm. Homogeneous echotexture.  NECK: No cervical lymphadenopathy.  NODULES:  Nodule 1: There is 4.4 x 2.5 x 1.8 cm nodule occupying the majority of the left thyroid lobe.   Composition: Mixed cystic and solid, 1 point   Echogenicity: Hyperechoic or isoechoic, 1 point   Shape: Wider-than-tall, 0 points   Margin: Ill-defined, 0 points    Echogenic Foci: Punctate echoic foci, 3 points   Point Total: 4-6 points. TI-RADS 4. If 1.5 cm or larger, recommend FNA; if 1 cm or larger, follow up US (annually for 5 years).                                                       IMPRESSION:  1.  There is 4.4 cm left thyroid nodule, which meets the criteria for FNA/tissue sampling. No right thyroid nodule visualized.  Nodules are characterized per  ACR Thyroid Imaging, Reporting and Data System (TI-RADS): White Paper of the ACR TI-RADS Committee  Ramon Brito et al. Journal of the American College of Radiology 2017. Volume 14 (2017), Issue 5, 587-238.     Lisa Kelly MD

## 2024-06-27 LAB — T3 SERPL-MCNC: 121 NG/DL (ref 85–202)

## 2024-06-27 NOTE — ADDENDUM NOTE
Addended by: KOBY RAMÍREZ on: 6/26/2024 09:39 PM     Modules accepted: Orders     Normal, please notify patient

## 2024-07-22 ENCOUNTER — TELEPHONE (OUTPATIENT)
Dept: FAMILY MEDICINE | Facility: CLINIC | Age: 29
End: 2024-07-22
Payer: COMMERCIAL

## 2024-07-22 DIAGNOSIS — Z30.9 ENCOUNTER FOR CONTRACEPTIVE MANAGEMENT, UNSPECIFIED TYPE: Primary | ICD-10-CM

## 2024-07-22 NOTE — TELEPHONE ENCOUNTER
Patient is on the CSS for her depo tomorrow.    Last one was given 05/06/24    No active CAM order on file.    Order iglesia'd up

## 2024-07-23 ENCOUNTER — ALLIED HEALTH/NURSE VISIT (OUTPATIENT)
Dept: FAMILY MEDICINE | Facility: CLINIC | Age: 29
End: 2024-07-23
Payer: COMMERCIAL

## 2024-07-23 DIAGNOSIS — Z30.9 ENCOUNTER FOR CONTRACEPTIVE MANAGEMENT, UNSPECIFIED TYPE: Primary | ICD-10-CM

## 2024-07-23 PROCEDURE — 99207 PR NO CHARGE NURSE ONLY: CPT

## 2024-07-23 PROCEDURE — 96372 THER/PROPH/DIAG INJ SC/IM: CPT | Performed by: FAMILY MEDICINE

## 2024-07-23 RX ORDER — MEDROXYPROGESTERONE ACETATE 150 MG/ML
150 INJECTION, SUSPENSION INTRAMUSCULAR
Status: ACTIVE | OUTPATIENT
Start: 2024-07-23 | End: 2025-07-18

## 2024-07-23 RX ADMIN — MEDROXYPROGESTERONE ACETATE 150 MG: 150 INJECTION, SUSPENSION INTRAMUSCULAR at 09:08

## 2024-07-25 ENCOUNTER — LAB (OUTPATIENT)
Dept: LAB | Facility: CLINIC | Age: 29
End: 2024-07-25
Payer: COMMERCIAL

## 2024-07-25 DIAGNOSIS — E05.10 TOXIC THYROID NODULE: ICD-10-CM

## 2024-07-25 DIAGNOSIS — E05.10 THYROTOXICOSIS WITH TOXIC SINGLE THYROID NODULE AND WITHOUT THYROID STORM: ICD-10-CM

## 2024-07-25 LAB
T3 SERPL-MCNC: 128 NG/DL (ref 85–202)
T4 FREE SERPL-MCNC: 1.08 NG/DL (ref 0.9–1.7)
TSH SERPL DL<=0.005 MIU/L-ACNC: 0.19 UIU/ML (ref 0.3–4.2)

## 2024-07-25 PROCEDURE — 84439 ASSAY OF FREE THYROXINE: CPT | Performed by: PATHOLOGY

## 2024-07-25 PROCEDURE — 36415 COLL VENOUS BLD VENIPUNCTURE: CPT | Performed by: PATHOLOGY

## 2024-07-25 PROCEDURE — 99000 SPECIMEN HANDLING OFFICE-LAB: CPT | Performed by: PATHOLOGY

## 2024-07-25 PROCEDURE — 84443 ASSAY THYROID STIM HORMONE: CPT | Performed by: PATHOLOGY

## 2024-07-25 PROCEDURE — 84480 ASSAY TRIIODOTHYRONINE (T3): CPT

## 2024-08-15 DIAGNOSIS — E05.10 TOXIC THYROID NODULE: Primary | ICD-10-CM

## 2024-08-15 DIAGNOSIS — E05.10 THYROTOXICOSIS WITH TOXIC SINGLE THYROID NODULE AND WITHOUT THYROID STORM: ICD-10-CM

## 2024-08-15 RX ORDER — METHIMAZOLE 5 MG/1
5 TABLET ORAL DAILY
Qty: 30 TABLET | Refills: 1 | Status: SHIPPED | OUTPATIENT
Start: 2024-08-15

## 2024-09-14 ENCOUNTER — LAB (OUTPATIENT)
Dept: LAB | Facility: CLINIC | Age: 29
End: 2024-09-14
Payer: COMMERCIAL

## 2024-09-14 DIAGNOSIS — E05.10 TOXIC THYROID NODULE: ICD-10-CM

## 2024-09-14 DIAGNOSIS — E05.10 THYROTOXICOSIS WITH TOXIC SINGLE THYROID NODULE AND WITHOUT THYROID STORM: ICD-10-CM

## 2024-09-14 LAB
T3 SERPL-MCNC: 127 NG/DL (ref 85–202)
T4 FREE SERPL-MCNC: 0.95 NG/DL (ref 0.9–1.7)
TSH SERPL DL<=0.005 MIU/L-ACNC: 1.19 UIU/ML (ref 0.3–4.2)

## 2024-09-14 PROCEDURE — 84480 ASSAY TRIIODOTHYRONINE (T3): CPT | Performed by: INTERNAL MEDICINE

## 2024-09-14 PROCEDURE — 84443 ASSAY THYROID STIM HORMONE: CPT | Performed by: INTERNAL MEDICINE

## 2024-09-14 PROCEDURE — 84439 ASSAY OF FREE THYROXINE: CPT | Performed by: INTERNAL MEDICINE

## 2024-09-14 PROCEDURE — 36415 COLL VENOUS BLD VENIPUNCTURE: CPT | Performed by: INTERNAL MEDICINE

## 2024-09-18 NOTE — TELEPHONE ENCOUNTER
FUTURE VISIT INFORMATION      FUTURE VISIT INFORMATION:  Date: 10/28/24  Time: 2:40pm  Location: [facility]  REFERRAL INFORMATION:  Referring provider:  Lisa Kelly MD   Referring providers clinic:  [facility]  Reason for visit/diagnosis  Toxic thyroid nodule [E05.10]. Thyrotoxicosis with toxic single thyroid nodule and without thyroid storm [E05.1...toxic thyroid adenoma causing hyperthyroidism. Refer to [facility] or [facility]. Ref by Lisa Kelly MD. [facility] verified     RECORDS REQUESTED FROM:       Clinic name Comments Records Status Imaging Status   [facility]  6/26/24, 5/4/24- Ov wLisa Shannon MD  [facility]     [facility]  1/12/24 - UC OV with Silvia Gold NP  CE    [facility]  5/1/24 - [facility] OV with Sparkle Morrissey NP  CE    [facility]  6/3/24- NM Thyroid   5/21/24- US Biopsy Thyroid   5/1/24- Us Thyroid  [facility] PACS

## 2024-10-11 ENCOUNTER — MYC REFILL (OUTPATIENT)
Dept: ENDOCRINOLOGY | Facility: CLINIC | Age: 29
End: 2024-10-11
Payer: COMMERCIAL

## 2024-10-11 DIAGNOSIS — E05.10 THYROTOXICOSIS WITH TOXIC SINGLE THYROID NODULE AND WITHOUT THYROID STORM: ICD-10-CM

## 2024-10-11 DIAGNOSIS — E05.10 TOXIC THYROID NODULE: ICD-10-CM

## 2024-10-11 RX ORDER — METHIMAZOLE 5 MG/1
5 TABLET ORAL DAILY
Qty: 30 TABLET | Refills: 1 | Status: SHIPPED | OUTPATIENT
Start: 2024-10-11

## 2024-10-12 ENCOUNTER — LAB (OUTPATIENT)
Dept: LAB | Facility: CLINIC | Age: 29
End: 2024-10-12
Payer: COMMERCIAL

## 2024-10-12 DIAGNOSIS — E05.10 THYROTOXICOSIS WITH TOXIC SINGLE THYROID NODULE AND WITHOUT THYROID STORM: ICD-10-CM

## 2024-10-12 DIAGNOSIS — E05.10 TOXIC THYROID NODULE: ICD-10-CM

## 2024-10-12 LAB
T3 SERPL-MCNC: 123 NG/DL (ref 85–202)
T4 FREE SERPL-MCNC: 0.98 NG/DL (ref 0.9–1.7)
TSH SERPL DL<=0.005 MIU/L-ACNC: 1.37 UIU/ML (ref 0.3–4.2)

## 2024-10-12 PROCEDURE — 36415 COLL VENOUS BLD VENIPUNCTURE: CPT

## 2024-10-12 PROCEDURE — 84443 ASSAY THYROID STIM HORMONE: CPT

## 2024-10-12 PROCEDURE — 84439 ASSAY OF FREE THYROXINE: CPT

## 2024-10-12 PROCEDURE — 84480 ASSAY TRIIODOTHYRONINE (T3): CPT

## 2024-10-15 ENCOUNTER — ALLIED HEALTH/NURSE VISIT (OUTPATIENT)
Dept: FAMILY MEDICINE | Facility: CLINIC | Age: 29
End: 2024-10-15
Payer: COMMERCIAL

## 2024-10-15 DIAGNOSIS — Z30.9 ENCOUNTER FOR CONTRACEPTIVE MANAGEMENT, UNSPECIFIED TYPE: Primary | ICD-10-CM

## 2024-10-15 PROCEDURE — 99207 PR NO CHARGE NURSE ONLY: CPT

## 2024-10-15 PROCEDURE — 96372 THER/PROPH/DIAG INJ SC/IM: CPT | Performed by: FAMILY MEDICINE

## 2024-10-15 RX ADMIN — MEDROXYPROGESTERONE ACETATE 150 MG: 150 INJECTION, SUSPENSION INTRAMUSCULAR at 10:09

## 2024-10-28 ENCOUNTER — MYC MEDICAL ADVICE (OUTPATIENT)
Dept: OTOLARYNGOLOGY | Facility: CLINIC | Age: 29
End: 2024-10-28

## 2024-10-28 ENCOUNTER — PRE VISIT (OUTPATIENT)
Dept: OTOLARYNGOLOGY | Facility: CLINIC | Age: 29
End: 2024-10-28

## 2024-10-28 ENCOUNTER — PREP FOR PROCEDURE (OUTPATIENT)
Dept: OTOLARYNGOLOGY | Facility: CLINIC | Age: 29
End: 2024-10-28

## 2024-10-28 ENCOUNTER — OFFICE VISIT (OUTPATIENT)
Dept: OTOLARYNGOLOGY | Facility: CLINIC | Age: 29
End: 2024-10-28
Payer: COMMERCIAL

## 2024-10-28 VITALS
HEART RATE: 85 BPM | SYSTOLIC BLOOD PRESSURE: 138 MMHG | WEIGHT: 170.8 LBS | OXYGEN SATURATION: 99 % | BODY MASS INDEX: 28.46 KG/M2 | DIASTOLIC BLOOD PRESSURE: 92 MMHG | HEIGHT: 65 IN

## 2024-10-28 DIAGNOSIS — E05.10 TOXIC THYROID NODULE: ICD-10-CM

## 2024-10-28 DIAGNOSIS — E05.10 THYROTOXICOSIS WITH TOXIC SINGLE THYROID NODULE AND WITHOUT THYROID STORM: ICD-10-CM

## 2024-10-28 DIAGNOSIS — E05.10 TOXIC THYROID NODULE: Primary | ICD-10-CM

## 2024-10-28 PROCEDURE — 99204 OFFICE O/P NEW MOD 45 MIN: CPT | Performed by: SURGERY

## 2024-10-28 RX ORDER — DEXAMETHASONE SODIUM PHOSPHATE 4 MG/ML
10 INJECTION, SOLUTION INTRA-ARTICULAR; INTRALESIONAL; INTRAMUSCULAR; INTRAVENOUS; SOFT TISSUE ONCE
OUTPATIENT
Start: 2024-10-28 | End: 2024-10-28

## 2024-10-28 ASSESSMENT — PAIN SCALES - GENERAL: PAINLEVEL_OUTOF10: NO PAIN (0)

## 2024-10-28 NOTE — PROGRESS NOTES
"SURGERY CLINIC CONSULTATION    REASON FOR CONSULTATION:  Kezia Loera was referred by Dr. Kelly for evaluation and discussion of treatment options for Hyperfunctioning thyroid nodule     HISTORY OF PRESENT ILLNESS:  Kezia Loera is a 29 year old female who Was noted to have a thyroid nodule in May 2024.  Ultrasound confirmed a left 4.4 cm thyroid nodule.  Thyroid function test showed that the patient had a suppressed TSH with an elevated T3 and T4.  Nuclear medicine uptake scan revealed a hypermetabolic left lobe.  Biopsy of the left lobe of the thyroid was benign.  The patient was started on methimazole and her most recent labs indicate that she is euthyroid.    The patient denies any symptoms with this nodule, specifically no problems with voice quality inspiration or swallowing.  She does have symptoms of hyperthyroidism that have resolved primarily.  She has no previous thyroid carcinoma history.  No previous head neck radiation treatment.  No previous neck surgery      REVIEW OF SYSTEMS:  ROS EXAM: 10 point view of systems is pertinent for that noted in the HPI  Patient Active Problem List   Diagnosis    Toxic thyroid nodule       No past surgical history on file.    Allergies   Allergen Reactions    Aspirin Other (See Comments)     stiffiness    Ibuprofen Other (See Comments)     stuffy       [unfilled]        Family History   Problem Relation Age of Onset    Brain Cancer Paternal Grandfather     Anesthesia Reaction No family hx of     Thrombosis No family hx of     Thyroid Disease No family hx of     Nephrolithiasis No family hx of     Diabetes No family hx of           PHYSICAL EXAM:  BP (!) 138/92 (BP Location: Left arm, Patient Position: Sitting, Cuff Size: Adult Regular)   Pulse 85   Ht 1.651 m (5' 5\")   Wt 77.5 kg (170 lb 12.8 oz)   SpO2 99%   BMI 28.42 kg/m      Neck: On inspection there is a clear nodule in the left lobe.  In palpating the neck the trachea is midline.  There is a " well-circumscribed soft nodule in the left lobe of the thyroid measuring greater than 4 cm as indicated on the ultrasound.  Right lobe of the thyroid gland is within normal limits.    I personally reviewed the radiographic images and laboratory data    ASSESSMENT:   1. Toxic thyroid nodule    2. 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  The surgical procedure was discussed with the patient including but not limited to the risks of bleeding infection injury to the recurrent laryngeal nerve or nerves potential permanent hypocalcemia or loss of airway.  This can be done on outpatient basis.  The postoperative course was also discussed with the patient.      Dawn Alfred MD

## 2024-10-28 NOTE — NURSING NOTE
"Chief Complaint   Patient presents with    Consult   Blood pressure (!) 138/92, pulse 85, height 1.651 m (5' 5\"), weight 77.5 kg (170 lb 12.8 oz), SpO2 99%. Clint Corrigan, EMT    "

## 2024-10-28 NOTE — PROGRESS NOTES
Teaching Flowsheet - ENT   Relevant Diagnosis: toxic thyroid nodule  Teaching Topic:Person(s) involved in teaching:       Motivation Level:  Asks Questions:   Yes  Eager to Learn:   Yes  Cooperative:   Yes  Receptive (willing/able to accept information):   Yes  Comments: Reviewed pre-op H and P,  NPO prior to  surgery,  pre-op scrub (given Hibiclens)  Reviewed post-op  cares , activity and pain.     Patient demonstrates understanding of the following:  Reason for the appointment, diagnosis and treatment plan:   Yes  Knowledge of proper use of medications and conditions for which they are ordered (with special attention to potential side effects or drug interactions):  stop aspirin products 1 week before surgery Yes  Which situations necessitate calling provider and whom to contact:   Yes  Nutritional needs and diet plan:   Yes  Pain management techniques:   Yes  Patient instructed on hand hygiene:  Yes  How and/when to access community resources:   Yes     Infection Prevention:  Patient   demonstrates understanding of the following:  Surgical procedure site care taught Yes  Signs and symptoms of infection taught Yes  Wound care taught Yes  Instructional Materials Used/Given: pre- op booklet,verbal instruction.    Luz Mena RN

## 2024-10-28 NOTE — LETTER
10/28/2024       RE: Kezia Loera  [address] [place] [address]     Dear Colleague,    Thank you for referring your patient, Kezia Loera, to the [facility] [place] at [facility]. Please see a copy of my visit note below.    SURGERY CLINIC CONSULTATION    REASON FOR CONSULTATION:  Kezia Loera was referred by Dr. Kelly for evaluation and discussion of treatment options for Hyperfunctioning thyroid nodule     HISTORY OF PRESENT ILLNESS:  Kezia Loera is a 29 year old female who Was noted to have a thyroid nodule in May 2024.  Ultrasound confirmed a left 4.4 cm thyroid nodule.  Thyroid function test showed that the patient had a suppressed TSH with an elevated T3 and T4.  Nuclear medicine uptake scan revealed a hypermetabolic left lobe.  Biopsy of the left lobe of the thyroid was benign.  The patient was started on methimazole and her most recent labs indicate that she is euthyroid.    The patient denies any symptoms with this nodule, specifically no problems with voice quality inspiration or swallowing.  She does have symptoms of hyperthyroidism that have resolved primarily.  She has no previous thyroid carcinoma history.  No previous head neck radiation treatment.  No previous neck surgery      REVIEW OF SYSTEMS:  ROS EXAM: 10 point view of systems is pertinent for that noted in the HPI  Patient Active Problem List   Diagnosis     Toxic thyroid nodule       No past surgical history on file.    Allergies   Allergen Reactions     Aspirin Other (See Comments)     stiffiness     Ibuprofen Other (See Comments)     stuffy       [unfilled]        Family History   Problem Relation Age of Onset     Brain Cancer Paternal Grandfather      Anesthesia Reaction No family hx of      Thrombosis No family hx of      Thyroid Disease No family hx of      Nephrolithiasis No family hx of      Diabetes No family hx of        "    PHYSICAL EXAM:  BP (!) 138/92 (BP Location: Left arm, Patient Position: Sitting, Cuff Size: Adult Regular)   Pulse 85   Ht 1.651 m (5' 5\")   Wt 77.5 kg (170 lb 12.8 oz)   SpO2 99%   BMI 28.42 kg/m      Neck: On inspection there is a clear nodule in the left lobe.  In palpating the neck the trachea is midline.  There is a well-circumscribed soft nodule in the left lobe of the thyroid measuring greater than 4 cm as indicated on the ultrasound.  Right lobe of the thyroid gland is within normal limits.    I personally reviewed the radiographic images and laboratory data    ASSESSMENT:   1. Toxic thyroid nodule    2. 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  The surgical procedure was discussed with the patient including but not limited to the risks of bleeding infection injury to the recurrent laryngeal nerve or nerves potential permanent hypocalcemia or loss of airway.  This can be done on outpatient basis.  The postoperative course was also discussed with the patient.      Dawn Alfred MD              Teaching Flowsheet - ENT   Relevant Diagnosis: toxic thyroid nodule  Teaching Topic:Person(s) involved in teaching:       Motivation Level:  Asks Questions:   Yes  Eager to Learn:   Yes  Cooperative:   Yes  Receptive (willing/able to accept information):   Yes  Comments: Reviewed pre-op H and P,  NPO prior to  surgery,  pre-op scrub (given Hibiclens)  Reviewed post-op  cares , activity and pain.     Patient demonstrates understanding of the following:  Reason for the appointment, diagnosis and treatment plan:   Yes  Knowledge of proper use of medications and conditions for which they are ordered (with special attention to potential side effects or drug interactions):  stop aspirin products 1 week before surgery Yes  Which situations necessitate calling provider and whom to contact:   Yes  Nutritional " needs and diet plan:   Yes  Pain management techniques:   Yes  Patient instructed on hand hygiene:  Yes  How and/when to access community resources:   Yes     Infection Prevention:  Patient   demonstrates understanding of the following:  Surgical procedure site care taught Yes  Signs and symptoms of infection taught Yes  Wound care taught Yes  Instructional Materials Used/Given: pre- op booklet,verbal instruction.    Luz Mena RN      Again, thank you for allowing me to participate in the care of your patient.      Sincerely,    Dawn Alfred MD

## 2024-10-29 ENCOUNTER — TELEPHONE (OUTPATIENT)
Dept: OTOLARYNGOLOGY | Facility: CLINIC | Age: 29
End: 2024-10-29
Payer: COMMERCIAL

## 2024-10-29 NOTE — TELEPHONE ENCOUNTER
Scheduled surgery with Dr. Alfred on 1/21/2025 - patient     Spoke with: Patient    Surgery is located at [facility]    Patient will be seen for their H&P by their PCP Sparkle Morrissey APRN CNP within 30 days of surgery - Confirmed PCP on file is up to date     Does patient need a consult before upcoming surgery? No    Anesthesia type: General    Requested Imaging required for surgery: No    Patient is scheduled for their 2 week virtual post op on 2/10/2025 at 1pm with Dr. Alfred    Patient will receive their surgery packet via [employer] per their preference    Patient was not provided a start time for surgery & is aware they will receive this information 3-5 days before surgery    Additional comments: Patient was instructed to call back with any further questions or concerns.     Sujata Silver on 10/29/2024 at 9:41 AM

## 2024-10-29 NOTE — TELEPHONE ENCOUNTER
Left patient a voicemail to schedule surgery for Left thyroid lobectomy and isthmusectomy with Dr. Alfred - Left Surgery Scheduling line for callback [phone]    Sujata Silver on 10/29/2024 at 8:39 AM

## 2024-11-14 ENCOUNTER — LAB (OUTPATIENT)
Dept: LAB | Facility: CLINIC | Age: 29
End: 2024-11-14
Payer: COMMERCIAL

## 2024-11-14 DIAGNOSIS — E05.10 TOXIC THYROID NODULE: ICD-10-CM

## 2024-11-14 DIAGNOSIS — E05.10 THYROTOXICOSIS WITH TOXIC SINGLE THYROID NODULE AND WITHOUT THYROID STORM: ICD-10-CM

## 2024-11-14 LAB
T3 SERPL-MCNC: 119 NG/DL (ref 85–202)
T4 FREE SERPL-MCNC: 0.96 NG/DL (ref 0.9–1.7)
TSH SERPL DL<=0.005 MIU/L-ACNC: 1.51 UIU/ML (ref 0.3–4.2)

## 2024-11-14 PROCEDURE — 84480 ASSAY TRIIODOTHYRONINE (T3): CPT | Performed by: INTERNAL MEDICINE

## 2024-11-14 PROCEDURE — 84439 ASSAY OF FREE THYROXINE: CPT | Performed by: INTERNAL MEDICINE

## 2024-11-14 PROCEDURE — 36415 COLL VENOUS BLD VENIPUNCTURE: CPT | Performed by: INTERNAL MEDICINE

## 2024-11-14 PROCEDURE — 84443 ASSAY THYROID STIM HORMONE: CPT | Performed by: INTERNAL MEDICINE

## 2024-12-10 ENCOUNTER — MYC REFILL (OUTPATIENT)
Dept: ENDOCRINOLOGY | Facility: CLINIC | Age: 29
End: 2024-12-10
Payer: COMMERCIAL

## 2024-12-10 DIAGNOSIS — E05.10 THYROTOXICOSIS WITH TOXIC SINGLE THYROID NODULE AND WITHOUT THYROID STORM: ICD-10-CM

## 2024-12-10 DIAGNOSIS — E05.10 TOXIC THYROID NODULE: ICD-10-CM

## 2024-12-10 RX ORDER — METHIMAZOLE 5 MG/1
5 TABLET ORAL DAILY
Qty: 30 TABLET | Refills: 1 | Status: SHIPPED | OUTPATIENT
Start: 2024-12-10

## 2024-12-14 ENCOUNTER — LAB (OUTPATIENT)
Dept: LAB | Facility: CLINIC | Age: 29
End: 2024-12-14
Payer: COMMERCIAL

## 2024-12-14 DIAGNOSIS — E05.10 THYROTOXICOSIS WITH TOXIC SINGLE THYROID NODULE AND WITHOUT THYROID STORM: ICD-10-CM

## 2024-12-14 DIAGNOSIS — E05.10 TOXIC THYROID NODULE: ICD-10-CM

## 2024-12-14 LAB
T3 SERPL-MCNC: 123 NG/DL (ref 85–202)
T4 FREE SERPL-MCNC: 1.05 NG/DL (ref 0.9–1.7)
TSH SERPL DL<=0.005 MIU/L-ACNC: 2.08 UIU/ML (ref 0.3–4.2)

## 2024-12-14 PROCEDURE — 84480 ASSAY TRIIODOTHYRONINE (T3): CPT | Performed by: INTERNAL MEDICINE

## 2024-12-14 PROCEDURE — 84443 ASSAY THYROID STIM HORMONE: CPT | Performed by: INTERNAL MEDICINE

## 2024-12-14 PROCEDURE — 84439 ASSAY OF FREE THYROXINE: CPT | Performed by: INTERNAL MEDICINE

## 2024-12-14 PROCEDURE — 36415 COLL VENOUS BLD VENIPUNCTURE: CPT | Performed by: INTERNAL MEDICINE

## 2025-01-07 ENCOUNTER — ALLIED HEALTH/NURSE VISIT (OUTPATIENT)
Dept: FAMILY MEDICINE | Facility: CLINIC | Age: 30
End: 2025-01-07
Payer: COMMERCIAL

## 2025-01-07 DIAGNOSIS — Z30.9 ENCOUNTER FOR CONTRACEPTIVE MANAGEMENT, UNSPECIFIED TYPE: Primary | ICD-10-CM

## 2025-01-07 PROCEDURE — 96372 THER/PROPH/DIAG INJ SC/IM: CPT | Performed by: FAMILY MEDICINE

## 2025-01-07 PROCEDURE — 99207 PR NO CHARGE NURSE ONLY: CPT

## 2025-01-07 RX ADMIN — MEDROXYPROGESTERONE ACETATE 150 MG: 150 INJECTION, SUSPENSION INTRAMUSCULAR at 09:55

## 2025-01-07 NOTE — PROGRESS NOTES
Clinic Administered Medication Documentation      Depo Provera Documentation    Depo-Provera Standing Order inclusion/exclusion criteria reviewed.     Is this the initial or subsequent dose of Depo Provera? Subsequent dose - patient is within the acceptable window of time (11-15 weeks) for subsequent injection. Pregnancy test not indicated.    Patient meets: inclusion criteria     Is there an active order (written within the past 365 days, with administrations remaining, not ) in the chart? Yes.     Prior to injection, verified patient identity using patient's name and date of birth. Medication was administered. Please see MAR and medication order for additional information.     Vial/Syringe: Single dose vial. Was entire vial of medication used? Yes    NEXT INJECTION DUE: 3/25/25 - 25    Verified that the patient has refills remaining in their prescription.

## 2025-01-15 ENCOUNTER — OFFICE VISIT (OUTPATIENT)
Dept: FAMILY MEDICINE | Facility: CLINIC | Age: 30
End: 2025-01-15
Payer: COMMERCIAL

## 2025-01-15 VITALS
SYSTOLIC BLOOD PRESSURE: 120 MMHG | DIASTOLIC BLOOD PRESSURE: 81 MMHG | HEART RATE: 71 BPM | TEMPERATURE: 98.4 F | BODY MASS INDEX: 28.37 KG/M2 | OXYGEN SATURATION: 98 % | RESPIRATION RATE: 16 BRPM | WEIGHT: 170.5 LBS

## 2025-01-15 DIAGNOSIS — E05.10 TOXIC THYROID NODULE: ICD-10-CM

## 2025-01-15 DIAGNOSIS — Z01.818 PRE-OP EVALUATION: Primary | ICD-10-CM

## 2025-01-15 LAB
ANION GAP SERPL CALCULATED.3IONS-SCNC: 9 MMOL/L (ref 7–15)
BUN SERPL-MCNC: 12.4 MG/DL (ref 6–20)
CALCIUM SERPL-MCNC: 9.4 MG/DL (ref 8.8–10.4)
CHLORIDE SERPL-SCNC: 108 MMOL/L (ref 98–107)
CREAT SERPL-MCNC: 0.75 MG/DL (ref 0.51–0.95)
EGFRCR SERPLBLD CKD-EPI 2021: >90 ML/MIN/1.73M2
ERYTHROCYTE [DISTWIDTH] IN BLOOD BY AUTOMATED COUNT: 11.1 % (ref 10–15)
GLUCOSE SERPL-MCNC: 85 MG/DL (ref 70–99)
HCG UR QL: NEGATIVE
HCO3 SERPL-SCNC: 24 MMOL/L (ref 22–29)
HCT VFR BLD AUTO: 40.4 % (ref 35–47)
HGB BLD-MCNC: 14.1 G/DL (ref 11.7–15.7)
MCH RBC QN AUTO: 31.3 PG (ref 26.5–33)
MCHC RBC AUTO-ENTMCNC: 34.9 G/DL (ref 31.5–36.5)
MCV RBC AUTO: 90 FL (ref 78–100)
PLATELET # BLD AUTO: 285 10E3/UL (ref 150–450)
POTASSIUM SERPL-SCNC: 3.9 MMOL/L (ref 3.4–5.3)
RBC # BLD AUTO: 4.5 10E6/UL (ref 3.8–5.2)
SODIUM SERPL-SCNC: 141 MMOL/L (ref 135–145)
WBC # BLD AUTO: 4.1 10E3/UL (ref 4–11)

## 2025-01-15 PROCEDURE — G2211 COMPLEX E/M VISIT ADD ON: HCPCS | Performed by: FAMILY MEDICINE

## 2025-01-15 PROCEDURE — 81025 URINE PREGNANCY TEST: CPT | Performed by: FAMILY MEDICINE

## 2025-01-15 PROCEDURE — 80048 BASIC METABOLIC PNL TOTAL CA: CPT | Performed by: FAMILY MEDICINE

## 2025-01-15 PROCEDURE — 36415 COLL VENOUS BLD VENIPUNCTURE: CPT | Performed by: FAMILY MEDICINE

## 2025-01-15 PROCEDURE — 99214 OFFICE O/P EST MOD 30 MIN: CPT | Performed by: FAMILY MEDICINE

## 2025-01-15 PROCEDURE — 85027 COMPLETE CBC AUTOMATED: CPT | Performed by: FAMILY MEDICINE

## 2025-01-15 NOTE — PROGRESS NOTES
Preoperative Evaluation  [address] [address]  Phone: [phone]  Fax: [phone]  Primary Provider: NHI Leija CNP  Pre-op Performing Provider: Carmel Rojo MD  Mark 15, 2025                No data to display              Fax number for surgical facility: Note does not need to be faxed, will be available electronically in Epic.    Assessment & Plan     The proposed surgical procedure is considered INTERMEDIATE risk.    Pre-op evaluation    - HCG qualitative urine  - CBC with platelets  - Basic metabolic panel    Toxic thyroid nodule      The longitudinal plan of care for the diagnosis(es)/condition(s) as documented were addressed during this visit. Due to the added complexity in care, I will continue to support Kezia in the subsequent management and with ongoing continuity of care.      - No identified additional risk factors other than previously addressed    Antiplatelet or Anticoagulation Medication Instructions   - Patient is on no antiplatelet or anticoagulation medications.    Additional Medication Instructions  Take all scheduled medications on the day of surgery    Recommendation  Approval given to proceed with proposed procedure, without further diagnostic evaluation.    Kelby Mast is a 29 year old, presenting for the following:  Pre-Op Exam (DOS: 1/21/25)          1/15/2025     8:49 AM   Additional Questions   Roomed by Susi SCHULTZ   Accompanied by Self     HPI related to upcoming procedure: Hyperactive thyroid         1/15/2025   Pre-Op Questionnaire   Have you ever had a heart attack or stroke? No   Have you ever had surgery on your heart or blood vessels, such as a stent placement, a coronary artery bypass, or surgery on an artery in your head, neck, heart, or legs? No   Do you have chest pain with activity? No   Do you have a history of heart failure? No   Do you currently have a cold, bronchitis or  symptoms of other infection? No   Do you have a cough, shortness of breath, or wheezing? No   Do you or anyone in your family have previous history of blood clots? No   Do you or does anyone in your family have a serious bleeding problem such as prolonged bleeding following surgeries or cuts? No   Have you ever had problems with anemia or been told to take iron pills? No   Have you had any abnormal blood loss such as black, tarry or bloody stools, or abnormal vaginal bleeding? No   Have you ever had a blood transfusion? No   Are you willing to have a blood transfusion if it is medically needed before, during, or after your surgery? Yes   Have you or any of your relatives ever had problems with anesthesia? No   Do you have sleep apnea, excessive snoring or daytime drowsiness? No   Do you have any artifical heart valves or other implanted medical devices like a pacemaker, defibrillator, or continuous glucose monitor? No   Do you have artificial joints? No   Are you allergic to latex? No     Health Care Directive  Patient does not have a Health Care Directive: Discussed advance care planning with patient; however, patient declined at this time.    Preoperative Review of    reviewed - no record of controlled substances prescribed.          Patient Active Problem List    Diagnosis Date Noted    Toxic thyroid nodule 06/26/2024     Priority: Medium      Past Medical History:   Diagnosis Date    Allergic rhinosinusitis     Gastroesophageal reflux disease     Subclinical hyperthyroidism 05/01/2024    Thyroid nodule 05/01/2024    toxic adenoma left 4.4 cm     No past surgical history on file.  Current Outpatient Medications   Medication Sig Dispense Refill    cetirizine (ZYRTEC) 5 MG tablet Take 10 mg by mouth daily      methimazole (TAPAZOLE) 5 MG tablet Take 1 tablet (5 mg) by mouth daily. 30 tablet 1       Allergies   Allergen Reactions    Aspirin Other (See Comments)     stiffiness    Ibuprofen Other (See Comments)  "    stuffy        Social History     Tobacco Use    Smoking status: Former     Current packs/day: 0.00     Types: Cigarettes     Quit date: 2016     Years since quittin.0    Smokeless tobacco: Current    Tobacco comments:     Occ vape with THC   Substance Use Topics    Alcohol use: Yes     Comment: rarely       History   Drug Use    Types: Marijuana               Objective    /81   Pulse 71   Temp 98.4  F (36.9  C) (Oral)   Resp 16   Wt 77.3 kg (170 lb 8 oz)   SpO2 98%   BMI 28.37 kg/m     Estimated body mass index is 28.37 kg/m  as calculated from the following:    Height as of 10/28/24: 1.651 m (5' 5\").    Weight as of this encounter: 77.3 kg (170 lb 8 oz).    Physical Exam  GENERAL: alert and no distress  HENT: oropharynx clear and oral mucous membranes moist  NECK: no adenopathy, no asymmetry, masses, or scars  RESP: lungs clear to auscultation - no rales, rhonchi or wheezes  CV: regular rate and rhythm, normal S1 S2, no S3 or S4, no murmur, click or rub, no peripheral edema  ABDOMEN: soft, nontender, no hepatosplenomegaly, no masses and bowel sounds normal  MS: no gross musculoskeletal defects noted, no edema    Recent Labs   Lab Test 24  1210   HGB 14.2         POTASSIUM 4.0   CR 0.66        Diagnostics  Recent Results (from the past 48 hours)   CBC with platelets    Collection Time: 01/15/25  9:22 AM   Result Value Ref Range    WBC Count 4.1 4.0 - 11.0 10e3/uL    RBC Count 4.50 3.80 - 5.20 10e6/uL    Hemoglobin 14.1 11.7 - 15.7 g/dL    Hematocrit 40.4 35.0 - 47.0 %    MCV 90 78 - 100 fL    MCH 31.3 26.5 - 33.0 pg    MCHC 34.9 31.5 - 36.5 g/dL    RDW 11.1 10.0 - 15.0 %    Platelet Count 285 150 - 450 10e3/uL   Basic metabolic panel    Collection Time: 01/15/25  9:22 AM   Result Value Ref Range    Sodium 141 135 - 145 mmol/L    Potassium 3.9 3.4 - 5.3 mmol/L    Chloride 108 (H) 98 - 107 mmol/L    Carbon Dioxide (CO2) 24 22 - 29 mmol/L    Anion Gap 9 7 - 15 mmol/L    Urea " Nitrogen 12.4 6.0 - 20.0 mg/dL    Creatinine 0.75 0.51 - 0.95 mg/dL    GFR Estimate >90 >60 mL/min/1.73m2    Calcium 9.4 8.8 - 10.4 mg/dL    Glucose 85 70 - 99 mg/dL   HCG qualitative urine    Collection Time: 01/15/25  9:48 AM   Result Value Ref Range    hCG Urine Qualitative Negative Negative          Revised Cardiac Risk Index (RCRI)  The patient has the following serious cardiovascular risks for perioperative complications:   - No serious cardiac risks = 0 points     RCRI Interpretation: 0 points: Class I (very low risk - 0.4% complication rate)         Signed Electronically by: Carmel Rojo MD  A copy of this evaluation report is provided to the requesting physician.

## 2025-01-20 ENCOUNTER — ANESTHESIA EVENT (OUTPATIENT)
Dept: SURGERY | Facility: AMBULATORY SURGERY CENTER | Age: 30
End: 2025-01-20
Payer: COMMERCIAL

## 2025-01-20 NOTE — ANESTHESIA PREPROCEDURE EVALUATION
Anesthesia Pre-Procedure Evaluation    Patient: Kezia Loera   MRN: [id number] : 1995        Procedure : Procedure(s):  Left thyroid lobectomy and isthmusectomy          Past Medical History:   Diagnosis Date    Allergic rhinosinusitis     Gastroesophageal reflux disease     Subclinical hyperthyroidism 2024    Thyroid nodule 2024    toxic adenoma left 4.4 cm      No past surgical history on file.   Allergies   Allergen Reactions    Aspirin Other (See Comments)     stiffiness    Ibuprofen Other (See Comments)     stuffy      Social History     Tobacco Use    Smoking status: Former     Current packs/day: 0.00     Types: Cigarettes     Quit date:      Years since quittin.0    Smokeless tobacco: Current    Tobacco comments:     Occ vape with THC   Substance Use Topics    Alcohol use: Yes     Comment: rarely      Wt Readings from Last 1 Encounters:   01/15/25 77.3 kg (170 lb 8 oz)        Anesthesia Evaluation            ROS/MED HX  ENT/Pulmonary:       Neurologic:       Cardiovascular:       METS/Exercise Tolerance:     Hematologic:       Musculoskeletal:       GI/Hepatic:     (+) GERD,                   Renal/Genitourinary:       Endo:     (+)          thyroid problem,            Psychiatric/Substance Use:       Infectious Disease:       Malignancy:       Other:            Physical Exam    Airway        Mallampati: I       Respiratory Devices and Support         Dental       (+) Minor Abnormalities - some fillings, tiny chips      Cardiovascular          Rhythm and rate: regular     Pulmonary                   OUTSIDE LABS:  CBC:   Lab Results   Component Value Date    WBC 4.1 01/15/2025    WBC 5.7 2024    HGB 14.1 01/15/2025    HGB 14.2 2024    HCT 40.4 01/15/2025    HCT 39.7 2024     01/15/2025     2024     BMP:   Lab Results   Component Value Date     01/15/2025     2024    POTASSIUM 3.9 01/15/2025    POTASSIUM 4.0 2024     "CHLORIDE 108 (H) 01/15/2025    CHLORIDE 107 05/01/2024    CO2 24 01/15/2025    CO2 24 05/01/2024    BUN 12.4 01/15/2025    BUN 11.0 05/01/2024    CR 0.75 01/15/2025    CR 0.66 05/01/2024    GLC 85 01/15/2025    GLC 84 05/01/2024     COAGS: No results found for: \"PTT\", \"INR\", \"FIBR\"  POC:   Lab Results   Component Value Date    HCG Negative 01/15/2025     HEPATIC: No results found for: \"ALBUMIN\", \"PROTTOTAL\", \"ALT\", \"AST\", \"GGT\", \"ALKPHOS\", \"BILITOTAL\", \"BILIDIRECT\", \"NALDO\"  OTHER:   Lab Results   Component Value Date    STEPHANIE 9.4 01/15/2025    TSH 2.08 12/14/2024    T4 1.05 12/14/2024    T3 123 12/14/2024       Anesthesia Plan    ASA Status:  2    NPO Status:  NPO Appropriate    Anesthesia Type: General.     - Airway: ETT   Induction: Intravenous.   Maintenance: TIVA.        Consents    Anesthesia Plan(s) and associated risks, benefits, and realistic alternatives discussed. Questions answered and patient/representative(s) expressed understanding.     - Discussed:     - Discussed with:  Patient            Postoperative Care            Comments:               Jairo Wright MD    I have reviewed the pertinent notes and labs in the chart from the past 30 days and (re)examined the patient.  Any updates or changes from those notes are reflected in this note.                                   "

## 2025-01-21 ENCOUNTER — HOSPITAL ENCOUNTER (OUTPATIENT)
Facility: AMBULATORY SURGERY CENTER | Age: 30
Discharge: HOME | End: 2025-01-21
Attending: SURGERY
Payer: COMMERCIAL

## 2025-01-21 ENCOUNTER — ANESTHESIA (OUTPATIENT)
Dept: SURGERY | Facility: AMBULATORY SURGERY CENTER | Age: 30
End: 2025-01-21
Payer: COMMERCIAL

## 2025-01-21 VITALS
SYSTOLIC BLOOD PRESSURE: 131 MMHG | RESPIRATION RATE: 16 BRPM | DIASTOLIC BLOOD PRESSURE: 93 MMHG | TEMPERATURE: 97.8 F | OXYGEN SATURATION: 100 % | BODY MASS INDEX: 28.32 KG/M2 | HEIGHT: 65 IN | HEART RATE: 87 BPM | WEIGHT: 170 LBS

## 2025-01-21 DIAGNOSIS — Z98.890 STATUS POST SURGERY: Primary | ICD-10-CM

## 2025-01-21 LAB
HCG UR QL: NEGATIVE
INTERNAL QC OK POCT: NORMAL
POCT KIT EXPIRATION DATE: NORMAL
POCT KIT LOT NUMBER: NORMAL

## 2025-01-21 PROCEDURE — 88307 TISSUE EXAM BY PATHOLOGIST: CPT | Mod: 26 | Performed by: STUDENT IN AN ORGANIZED HEALTH CARE EDUCATION/TRAINING PROGRAM

## 2025-01-21 PROCEDURE — 88307 TISSUE EXAM BY PATHOLOGIST: CPT | Mod: TC | Performed by: SURGERY

## 2025-01-21 RX ORDER — DEXAMETHASONE SODIUM PHOSPHATE 4 MG/ML
INJECTION, SOLUTION INTRA-ARTICULAR; INTRALESIONAL; INTRAMUSCULAR; INTRAVENOUS; SOFT TISSUE PRN
Status: DISCONTINUED | OUTPATIENT
Start: 2025-01-21 | End: 2025-01-21

## 2025-01-21 RX ORDER — PROPOFOL 10 MG/ML
INJECTION, EMULSION INTRAVENOUS CONTINUOUS PRN
Status: DISCONTINUED | OUTPATIENT
Start: 2025-01-21 | End: 2025-01-21

## 2025-01-21 RX ORDER — NALOXONE HYDROCHLORIDE 0.4 MG/ML
0.1 INJECTION, SOLUTION INTRAMUSCULAR; INTRAVENOUS; SUBCUTANEOUS
Status: DISCONTINUED | OUTPATIENT
Start: 2025-01-21 | End: 2025-01-21 | Stop reason: HOSPADM

## 2025-01-21 RX ORDER — DEXAMETHASONE SODIUM PHOSPHATE 10 MG/ML
4 INJECTION, SOLUTION INTRAMUSCULAR; INTRAVENOUS
Status: DISCONTINUED | OUTPATIENT
Start: 2025-01-21 | End: 2025-01-22 | Stop reason: HOSPADM

## 2025-01-21 RX ORDER — PROPOFOL 10 MG/ML
INJECTION, EMULSION INTRAVENOUS PRN
Status: DISCONTINUED | OUTPATIENT
Start: 2025-01-21 | End: 2025-01-21

## 2025-01-21 RX ORDER — ONDANSETRON 4 MG/1
4 TABLET, ORALLY DISINTEGRATING ORAL EVERY 30 MIN PRN
Status: DISCONTINUED | OUTPATIENT
Start: 2025-01-21 | End: 2025-01-21 | Stop reason: HOSPADM

## 2025-01-21 RX ORDER — OXYCODONE HYDROCHLORIDE 5 MG/1
5-10 TABLET ORAL EVERY 4 HOURS PRN
Qty: 6 TABLET | Refills: 0 | Status: SHIPPED | OUTPATIENT
Start: 2025-01-21

## 2025-01-21 RX ORDER — ONDANSETRON 2 MG/ML
INJECTION INTRAMUSCULAR; INTRAVENOUS PRN
Status: DISCONTINUED | OUTPATIENT
Start: 2025-01-21 | End: 2025-01-21

## 2025-01-21 RX ORDER — DEXAMETHASONE SODIUM PHOSPHATE 10 MG/ML
10 INJECTION, SOLUTION INTRAMUSCULAR; INTRAVENOUS ONCE
Status: DISCONTINUED | OUTPATIENT
Start: 2025-01-21 | End: 2025-01-21 | Stop reason: HOSPADM

## 2025-01-21 RX ORDER — SODIUM CHLORIDE, SODIUM LACTATE, POTASSIUM CHLORIDE, CALCIUM CHLORIDE 600; 310; 30; 20 MG/100ML; MG/100ML; MG/100ML; MG/100ML
INJECTION, SOLUTION INTRAVENOUS CONTINUOUS PRN
Status: DISCONTINUED | OUTPATIENT
Start: 2025-01-21 | End: 2025-01-21

## 2025-01-21 RX ORDER — LIDOCAINE HYDROCHLORIDE 20 MG/ML
INJECTION, SOLUTION INFILTRATION; PERINEURAL PRN
Status: DISCONTINUED | OUTPATIENT
Start: 2025-01-21 | End: 2025-01-21

## 2025-01-21 RX ORDER — ACETAMINOPHEN 325 MG/1
975 TABLET ORAL ONCE
Status: COMPLETED | OUTPATIENT
Start: 2025-01-21 | End: 2025-01-21

## 2025-01-21 RX ORDER — ONDANSETRON 2 MG/ML
4 INJECTION INTRAMUSCULAR; INTRAVENOUS EVERY 30 MIN PRN
Status: DISCONTINUED | OUTPATIENT
Start: 2025-01-21 | End: 2025-01-21 | Stop reason: HOSPADM

## 2025-01-21 RX ORDER — ONDANSETRON 2 MG/ML
4 INJECTION INTRAMUSCULAR; INTRAVENOUS EVERY 30 MIN PRN
Status: DISCONTINUED | OUTPATIENT
Start: 2025-01-21 | End: 2025-01-22 | Stop reason: HOSPADM

## 2025-01-21 RX ORDER — HYDROMORPHONE HYDROCHLORIDE 1 MG/ML
0.2 INJECTION, SOLUTION INTRAMUSCULAR; INTRAVENOUS; SUBCUTANEOUS EVERY 5 MIN PRN
Status: DISCONTINUED | OUTPATIENT
Start: 2025-01-21 | End: 2025-01-21 | Stop reason: HOSPADM

## 2025-01-21 RX ORDER — OXYCODONE HYDROCHLORIDE 5 MG/1
5 TABLET ORAL
Status: COMPLETED | OUTPATIENT
Start: 2025-01-21 | End: 2025-01-21

## 2025-01-21 RX ORDER — GLYCOPYRROLATE 0.2 MG/ML
INJECTION, SOLUTION INTRAMUSCULAR; INTRAVENOUS PRN
Status: DISCONTINUED | OUTPATIENT
Start: 2025-01-21 | End: 2025-01-21

## 2025-01-21 RX ORDER — SODIUM CHLORIDE, SODIUM LACTATE, POTASSIUM CHLORIDE, CALCIUM CHLORIDE 600; 310; 30; 20 MG/100ML; MG/100ML; MG/100ML; MG/100ML
INJECTION, SOLUTION INTRAVENOUS CONTINUOUS
Status: DISCONTINUED | OUTPATIENT
Start: 2025-01-21 | End: 2025-01-21 | Stop reason: HOSPADM

## 2025-01-21 RX ORDER — ONDANSETRON 4 MG/1
4 TABLET, ORALLY DISINTEGRATING ORAL EVERY 30 MIN PRN
Status: DISCONTINUED | OUTPATIENT
Start: 2025-01-21 | End: 2025-01-22 | Stop reason: HOSPADM

## 2025-01-21 RX ORDER — NALOXONE HYDROCHLORIDE 0.4 MG/ML
0.1 INJECTION, SOLUTION INTRAMUSCULAR; INTRAVENOUS; SUBCUTANEOUS
Status: DISCONTINUED | OUTPATIENT
Start: 2025-01-21 | End: 2025-01-22 | Stop reason: HOSPADM

## 2025-01-21 RX ORDER — HYDROMORPHONE HYDROCHLORIDE 1 MG/ML
0.4 INJECTION, SOLUTION INTRAMUSCULAR; INTRAVENOUS; SUBCUTANEOUS EVERY 5 MIN PRN
Status: DISCONTINUED | OUTPATIENT
Start: 2025-01-21 | End: 2025-01-21 | Stop reason: HOSPADM

## 2025-01-21 RX ORDER — DEXAMETHASONE SODIUM PHOSPHATE 10 MG/ML
4 INJECTION, SOLUTION INTRAMUSCULAR; INTRAVENOUS
Status: DISCONTINUED | OUTPATIENT
Start: 2025-01-21 | End: 2025-01-21 | Stop reason: HOSPADM

## 2025-01-21 RX ORDER — FENTANYL CITRATE 50 UG/ML
INJECTION, SOLUTION INTRAMUSCULAR; INTRAVENOUS PRN
Status: DISCONTINUED | OUTPATIENT
Start: 2025-01-21 | End: 2025-01-21

## 2025-01-21 RX ORDER — FENTANYL CITRATE 50 UG/ML
25 INJECTION, SOLUTION INTRAMUSCULAR; INTRAVENOUS EVERY 5 MIN PRN
Status: DISCONTINUED | OUTPATIENT
Start: 2025-01-21 | End: 2025-01-21 | Stop reason: HOSPADM

## 2025-01-21 RX ORDER — OXYCODONE HYDROCHLORIDE 5 MG/1
10 TABLET ORAL
Status: DISCONTINUED | OUTPATIENT
Start: 2025-01-21 | End: 2025-01-22 | Stop reason: HOSPADM

## 2025-01-21 RX ORDER — FENTANYL CITRATE 50 UG/ML
50 INJECTION, SOLUTION INTRAMUSCULAR; INTRAVENOUS EVERY 5 MIN PRN
Status: DISCONTINUED | OUTPATIENT
Start: 2025-01-21 | End: 2025-01-21 | Stop reason: HOSPADM

## 2025-01-21 RX ORDER — LIDOCAINE 40 MG/G
CREAM TOPICAL
Status: DISCONTINUED | OUTPATIENT
Start: 2025-01-21 | End: 2025-01-21 | Stop reason: HOSPADM

## 2025-01-21 RX ADMIN — FENTANYL CITRATE 50 MCG: 50 INJECTION, SOLUTION INTRAMUSCULAR; INTRAVENOUS at 08:30

## 2025-01-21 RX ADMIN — Medication 50 MCG: at 09:15

## 2025-01-21 RX ADMIN — Medication 50 MCG: at 09:00

## 2025-01-21 RX ADMIN — PROPOFOL 150 MG: 10 INJECTION, EMULSION INTRAVENOUS at 08:32

## 2025-01-21 RX ADMIN — FENTANYL CITRATE 50 MCG: 50 INJECTION, SOLUTION INTRAMUSCULAR; INTRAVENOUS at 08:44

## 2025-01-21 RX ADMIN — LIDOCAINE HYDROCHLORIDE 100 MG: 20 INJECTION, SOLUTION INFILTRATION; PERINEURAL at 08:32

## 2025-01-21 RX ADMIN — DEXAMETHASONE SODIUM PHOSPHATE 10 MG: 4 INJECTION, SOLUTION INTRA-ARTICULAR; INTRALESIONAL; INTRAMUSCULAR; INTRAVENOUS; SOFT TISSUE at 08:35

## 2025-01-21 RX ADMIN — OXYCODONE HYDROCHLORIDE 5 MG: 5 TABLET ORAL at 09:48

## 2025-01-21 RX ADMIN — PROPOFOL 150 MCG/KG/MIN: 10 INJECTION, EMULSION INTRAVENOUS at 08:32

## 2025-01-21 RX ADMIN — Medication 100 MCG: at 08:35

## 2025-01-21 RX ADMIN — PROPOFOL 125 MCG/KG/MIN: 10 INJECTION, EMULSION INTRAVENOUS at 09:14

## 2025-01-21 RX ADMIN — Medication 100 MCG: at 08:38

## 2025-01-21 RX ADMIN — SODIUM CHLORIDE, SODIUM LACTATE, POTASSIUM CHLORIDE, CALCIUM CHLORIDE: 600; 310; 30; 20 INJECTION, SOLUTION INTRAVENOUS at 08:32

## 2025-01-21 RX ADMIN — GLYCOPYRROLATE 0.2 MG: 0.2 INJECTION, SOLUTION INTRAMUSCULAR; INTRAVENOUS at 08:39

## 2025-01-21 RX ADMIN — ACETAMINOPHEN 975 MG: 325 TABLET ORAL at 07:04

## 2025-01-21 RX ADMIN — GLYCOPYRROLATE 0.2 MG: 0.2 INJECTION, SOLUTION INTRAMUSCULAR; INTRAVENOUS at 08:35

## 2025-01-21 RX ADMIN — ONDANSETRON 4 MG: 2 INJECTION INTRAMUSCULAR; INTRAVENOUS at 09:15

## 2025-01-21 NOTE — ANESTHESIA POSTPROCEDURE EVALUATION
Patient: Kezia Loera    Procedure: Procedure(s):  Left thyroid lobectomy and isthmusectomy       Anesthesia Type:  General    Note:  Disposition: Outpatient   Postop Pain Control: Uneventful            Sign Out: Well controlled pain   PONV: No   Neuro/Psych: Uneventful            Sign Out: Acceptable/Baseline neuro status   Airway/Respiratory: Uneventful            Sign Out: Acceptable/Baseline resp. status   CV/Hemodynamics: Uneventful            Sign Out: Acceptable CV status; No obvious hypovolemia; No obvious fluid overload   Other NRE: NONE   DID A NON-ROUTINE EVENT OCCUR?            Last vitals:  Vitals Value Taken Time   /95 01/21/25 0956   Temp 36.6  C (97.9  F) 01/21/25 0956   Pulse 90 01/21/25 0956   Resp 10 01/21/25 0956   SpO2 99 % 01/21/25 0956       Electronically Signed By: Jairo Wright MD  January 21, 2025  1:51 PM

## 2025-01-21 NOTE — ANESTHESIA PROCEDURE NOTES
Airway       Patient location during procedure: OR       Procedure Start/Stop Times: 1/21/2025 8:33 AM  Staff -        CRNA: Gabriela Dumont APRN CRNA       Performed By: CRNAIndications and Patient Condition       Indications for airway management: naveed-procedural       Induction type:intravenous       Mask difficulty assessment: 1 - vent by mask    Final Airway Details       Final airway type: endotracheal airway       Successful airway: ETT - single, Oral and NIM  Endotracheal Airway Details        ETT size (mm): 6.0       Cuffed: yes       Successful intubation technique: video laryngoscopy       VL Blade Size: Glidescope 3       Grade View of Cords: 1       Adjucts: stylet       Position: Right       Measured from: lips       Secured at (cm): 22       Bite block used: Soft    Post intubation assessment        Placement verified by: capnometry and equal breath sounds        Number of attempts at approach: 1       Secured with: tape       Ease of procedure: easy       Dentition: Intact and Unchanged    Medication(s) Administered   Medication Administration Time: 1/21/2025 8:33 AM    Additional Comments       ETT visualized through the cords with MDA and surgeon

## 2025-01-21 NOTE — ANESTHESIA CARE TRANSFER NOTE
Patient: Kezia Loera    Procedure: Procedure(s):  Left thyroid lobectomy and isthmusectomy       Diagnosis: Toxic thyroid nodule [E05.10]  Diagnosis Additional Information: No value filed.    Anesthesia Type:   General     Note:    Oropharynx: oropharynx clear of all foreign objects and spontaneously breathing  Level of Consciousness: awake  Oxygen Supplementation: room air    Independent Airway: airway patency not satisfactory and stable  Dentition: dentition unchanged  Vital Signs Stable: post-procedure vital signs reviewed and stable  Report to RN Given: handoff report given  Patient transferred to: PACU    Handoff Report: Identifed the Patient, Identified the Reponsible Provider, Reviewed the pertinent medical history, Discussed the surgical course, Reviewed Intra-OP anesthesia mangement and issues during anesthesia, Set expectations for post-procedure period and Allowed opportunity for questions and acknowledgement of understanding      Vitals:  Vitals Value Taken Time   /86 01/21/25 0937   Temp 97    Pulse 92 01/21/25 0939   Resp 32 01/21/25 0939   SpO2 97 % 01/21/25 0939   Vitals shown include unfiled device data.    Electronically Signed By: NHI Booth CRNA  January 21, 2025  9:40 AM

## 2025-01-21 NOTE — DISCHARGE INSTRUCTIONS
[facility]  Home Care Following Anesthesia  For 24 hours after surgery:  Get plenty of rest.  A responsible adult must stay with you for at least 24 hours after you leave the surgery center.  Do not drive or use heavy equipment.  If you have weakness or tingling, don't drive or use heavy equipment until this feeling goes away.   Do not drink alcohol.   Avoid strenuous or risky activities.  Ask for help when climbing stairs.  You may feel lightheaded.  IF so, sit for a few minutes before standing.  Have someone help you get up.   If you have nausea (feel sick to your stomach): Drink only clear liquids such as apple juice, ginger ale, broth or 7-Up.  Rest may also help.  Be sure to drink enough fluids.  Move to a regular diet as you feel able.   You may have a slight fever.  Call the doctor if your fever is over 100 F (37.7 C) (taken under the tongue) or lasts longer than 24 hours.  You may have a dry mouth, a sore throat, muscle aches or trouble sleeping. These should go away after 24 hours.  Do not make important or legal decisions.   It is recommended to avoid smoking.               Tips for taking pain medications  To get the best pain relief possible, remember these points:  Take pain medications as directed, before pain becomes severe.  Pain medication can upset your stomach: taking it with food may help.  Constipation is a common side effect of pain medication. Drink plenty of  fluids.  Eat foods high in fiber. Take a stool softener if recommended by your doctor or pharmacist.  Do not drink alcohol, drive or operate machinery while taking pain medications.  Ask about other ways to control pain, such as with heat, ice or relaxation.    Tylenol/Acetaminophen Consumption    If you feel your pain relief is insufficient, you may take Tylenol/Acetaminophen in addition to your narcotic pain medication.   Be careful not to exceed 4,000 mg of Tylenol/Acetaminophen in a 24 hour  period from all sources.  If you are taking extra strength Tylenol/acetaminophen (500 mg), the maximum dose is 8 tablets in 24 hours.  If you are taking regular strength acetaminophen (325 mg), the maximum dose is 12 tablets in 24 hours.  Tylenol 975 mg given at 07:00am.   Ok to take more after 1:00pm.      Call a doctor for any of the following:  Signs of infection (fever, growing tenderness at the surgery site, a large amount of drainage or bleeding, severe pain, foul-smelling drainage, redness, swelling).  It has been over 8 to 10 hours since surgery and you are still not able to urinate (pass water).  Headache for over 24 hours.  Signs of Covid-19 infection (temperature over 100 degrees, shortness of breath, cough, loss of taste/smell, generalized body aches, persistent headache, chills, sore throat, nausea/vomiting/diarrhea)    Your doctor is:  Dr. Dawn Alfred, ENT Otolaryngology: [phone]                    After hours and weekends call the hospital @ [phone] and ask for the resident on call for:  ENT Otolaryngology  For emergency care, call the:  [place] [facility]:  [phone] (TTY for hearing impaired: [phone])

## 2025-01-21 NOTE — BRIEF OP NOTE
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  Complications: None.  Implants: * No implants in log *

## 2025-01-29 ENCOUNTER — OFFICE VISIT (OUTPATIENT)
Dept: PEDIATRICS | Facility: CLINIC | Age: 30
End: 2025-01-29
Payer: COMMERCIAL

## 2025-01-29 ENCOUNTER — HOSPITAL ENCOUNTER (OUTPATIENT)
Dept: CT IMAGING | Facility: HOSPITAL | Age: 30
Discharge: HOME | End: 2025-01-29
Attending: PHYSICIAN ASSISTANT
Payer: COMMERCIAL

## 2025-01-29 VITALS
RESPIRATION RATE: 17 BRPM | DIASTOLIC BLOOD PRESSURE: 86 MMHG | WEIGHT: 171.2 LBS | SYSTOLIC BLOOD PRESSURE: 135 MMHG | HEART RATE: 102 BPM | BODY MASS INDEX: 28.49 KG/M2 | OXYGEN SATURATION: 98 % | TEMPERATURE: 100 F

## 2025-01-29 DIAGNOSIS — R10.9 RIGHT FLANK PAIN: ICD-10-CM

## 2025-01-29 DIAGNOSIS — R50.9 LOW GRADE FEVER: ICD-10-CM

## 2025-01-29 DIAGNOSIS — K21.9 GASTROESOPHAGEAL REFLUX DISEASE WITHOUT ESOPHAGITIS: ICD-10-CM

## 2025-01-29 DIAGNOSIS — K76.9 LIVER LESION: ICD-10-CM

## 2025-01-29 DIAGNOSIS — R11.0 NAUSEA: ICD-10-CM

## 2025-01-29 DIAGNOSIS — R10.11 RUQ ABDOMINAL PAIN: ICD-10-CM

## 2025-01-29 DIAGNOSIS — R10.11 RUQ ABDOMINAL PAIN: Primary | ICD-10-CM

## 2025-01-29 LAB
ALBUMIN SERPL-MCNC: 4.5 G/DL (ref 3.4–5)
ALBUMIN UR-MCNC: NEGATIVE MG/DL
ALP SERPL-CCNC: 58 U/L (ref 40–150)
ALT SERPL W P-5'-P-CCNC: 17 U/L (ref 0–50)
ANION GAP SERPL CALCULATED.3IONS-SCNC: 8 MMOL/L (ref 3–14)
APPEARANCE UR: CLEAR
AST SERPL W P-5'-P-CCNC: 24 U/L (ref 0–45)
BACTERIA #/AREA URNS HPF: NORMAL /HPF
BASOPHILS # BLD AUTO: 0 10E3/UL (ref 0–0.2)
BASOPHILS NFR BLD AUTO: 0 %
BILIRUB SERPL-MCNC: 0.7 MG/DL (ref 0.2–1.3)
BILIRUB UR QL STRIP: NEGATIVE
BUN SERPL-MCNC: 9 MG/DL (ref 7–30)
CALCIUM SERPL-MCNC: 9.2 MG/DL (ref 8.5–10.1)
CHLORIDE BLD-SCNC: 113 MMOL/L (ref 94–109)
CO2 SERPL-SCNC: 23 MMOL/L (ref 20–32)
COLOR UR AUTO: YELLOW
CREAT SERPL-MCNC: 0.6 MG/DL (ref 0.52–1.04)
EGFRCR SERPLBLD CKD-EPI 2021: >90 ML/MIN/1.73M2
EOSINOPHIL # BLD AUTO: 0 10E3/UL (ref 0–0.7)
EOSINOPHIL NFR BLD AUTO: 0 %
ERYTHROCYTE [DISTWIDTH] IN BLOOD BY AUTOMATED COUNT: 11.2 % (ref 10–15)
FLUAV AG SPEC QL IA: NEGATIVE
FLUBV AG SPEC QL IA: NEGATIVE
GLUCOSE BLD-MCNC: 96 MG/DL (ref 70–99)
GLUCOSE UR STRIP-MCNC: NEGATIVE MG/DL
HCG UR QL: NEGATIVE
HCT VFR BLD AUTO: 42.7 % (ref 35–47)
HGB BLD-MCNC: 15.1 G/DL (ref 11.7–15.7)
HGB UR QL STRIP: ABNORMAL
IMM GRANULOCYTES # BLD: 0 10E3/UL
IMM GRANULOCYTES NFR BLD: 0 %
KETONES UR STRIP-MCNC: NEGATIVE MG/DL
LEUKOCYTE ESTERASE UR QL STRIP: NEGATIVE
LIPASE SERPL-CCNC: 32 U/L (ref 13–60)
LYMPHOCYTES # BLD AUTO: 1.3 10E3/UL (ref 0.8–5.3)
LYMPHOCYTES NFR BLD AUTO: 16 %
MCH RBC QN AUTO: 31.3 PG (ref 26.5–33)
MCHC RBC AUTO-ENTMCNC: 35.4 G/DL (ref 31.5–36.5)
MCV RBC AUTO: 88 FL (ref 78–100)
MONOCYTES # BLD AUTO: 0.3 10E3/UL (ref 0–1.3)
MONOCYTES NFR BLD AUTO: 4 %
NEUTROPHILS # BLD AUTO: 6.2 10E3/UL (ref 1.6–8.3)
NEUTROPHILS NFR BLD AUTO: 79 %
NITRATE UR QL: NEGATIVE
PH UR STRIP: 6 [PH] (ref 5–8)
PLATELET # BLD AUTO: 313 10E3/UL (ref 150–450)
POTASSIUM BLD-SCNC: 4 MMOL/L (ref 3.4–5.3)
PROT SERPL-MCNC: 7.8 G/DL (ref 6.8–8.8)
RBC # BLD AUTO: 4.83 10E6/UL (ref 3.8–5.2)
RBC #/AREA URNS AUTO: NORMAL /HPF
SODIUM SERPL-SCNC: 144 MMOL/L (ref 135–145)
SP GR UR STRIP: 1.01 (ref 1–1.03)
SQUAMOUS #/AREA URNS AUTO: NORMAL /LPF
UROBILINOGEN UR STRIP-ACNC: 0.2 E.U./DL
WBC # BLD AUTO: 7.9 10E3/UL (ref 4–11)
WBC #/AREA URNS AUTO: NORMAL /HPF

## 2025-01-29 PROCEDURE — 74177 CT ABD & PELVIS W/CONTRAST: CPT

## 2025-01-29 PROCEDURE — 250N000009 HC RX 250: Performed by: PHYSICIAN ASSISTANT

## 2025-01-29 PROCEDURE — 250N000011 HC RX IP 250 OP 636: Performed by: PHYSICIAN ASSISTANT

## 2025-01-29 RX ORDER — ONDANSETRON 4 MG/1
4 TABLET, ORALLY DISINTEGRATING ORAL EVERY 8 HOURS PRN
Qty: 12 TABLET | Refills: 0 | Status: SHIPPED | OUTPATIENT
Start: 2025-01-29

## 2025-01-29 RX ORDER — IOPAMIDOL 755 MG/ML
84 INJECTION, SOLUTION INTRAVASCULAR ONCE
Status: COMPLETED | OUTPATIENT
Start: 2025-01-29 | End: 2025-01-29

## 2025-01-29 RX ORDER — ONDANSETRON 2 MG/ML
4 INJECTION INTRAMUSCULAR; INTRAVENOUS
Status: ACTIVE | OUTPATIENT
Start: 2025-01-29 | End: 2025-01-30

## 2025-01-29 RX ADMIN — IOPAMIDOL 84 ML: 755 INJECTION, SOLUTION INTRAVENOUS at 12:09

## 2025-01-29 RX ADMIN — SODIUM CHLORIDE 71 ML: 9 INJECTION, SOLUTION INTRAVENOUS at 12:09

## 2025-01-29 RX ADMIN — ONDANSETRON 4 MG: 2 INJECTION INTRAMUSCULAR; INTRAVENOUS at 11:48

## 2025-01-29 ASSESSMENT — PAIN SCALES - GENERAL: PAINLEVEL_OUTOF10: MODERATE PAIN (5)

## 2025-01-29 NOTE — Clinical Note
Kezia Villagran was seen in the [facility] today and had a CT abd/pelvis with an incidental finding of a small liver lesion. MRI of the liver was suggested for further detail. Would you be willing to order this for the patient?  Em Walker PA-C

## 2025-01-29 NOTE — PROGRESS NOTES
Assessment/Plan:    Labs unremarkable. Influenza test negative. CT abd/pelvis without acute findings. No hx of malignancy, so small sclerotic bone lesions felt to be benign. Suspect right flank pain is likely due to muscle spasm or strain, patient declines muscle relaxer.   Also suspect patient has a nonspecific viral illness contributing to more generalized myalgias, nausea, & low grade fever. NSAIDs/Tyenol PRN, plenty of rest & fluids advised. Rx Zofran for nausea.  Mild GERD exacerbation can be managed with Tums PRN, and advised a 2 week course of Prilosec OTC if symptoms become more severe/persistent.     Incidental finding on CT of 14 mm low-attenuation right hepatic lesion is indeterminate. This is likely benign in an otherwise normal liver. No dedicated follow-up required. MRI could be obtained if there is clinical concern. Advised follow up with primary care for this; message sent to PCP as well.     See patient instructions below.    At the end of the encounter, I discussed results, diagnosis, medications. Discussed red flags for immediate return to clinic/ER, as well as indications for follow up if no improvement. Patient understood and agreed to plan. Patient was stable for discharge.    45 minutes was spent preparing for the visit and reviewing the patient's chart; getting a history and performing an exam; counseling and providing education to the patient, family, or caregiver; ordering medicines and/or tests; communicating with other healthcare professionals; documenting information in the medical record; interpreting results and sharing that information with the patient, family, or caregiver; and care coordination.       ICD-10-CM    1. RUQ abdominal pain  R10.11 Comprehensive metabolic panel     CBC with platelets differential     Lipase     UA with Microscopic reflex to Culture     sodium chloride (PF) 0.9% PF flush 3 mL     ondansetron (ZOFRAN) injection 4 mg     HCG qualitative urine     UA with  Microscopic reflex to Culture     HCG qualitative urine     Lipase     CBC with platelets differential     Comprehensive metabolic panel     UA Microscopic with Reflex to Culture     CANCELED: CT Abdomen Pelvis w/o & w Contrast     CANCELED: CT Abdomen Pelvis w/o & w Contrast      2. Right flank pain  R10.9       3. Nausea  R11.0 ondansetron (ZOFRAN ODT) 4 MG ODT tab      4. Low grade fever  R50.9 Influenza A & B Antigen      5. Gastroesophageal reflux disease without esophagitis  K21.9       6. Liver lesion  K76.9             Return in about 1 week (around 2/5/2025) for Follow up w/ primary care provider if not better.    KATHARINE Ledbetter, DONA  [facility]  -----------------------------------------------------------------------------------------------------------------------------------------------------    HPI:  Kezia Loera is a 29 year old female who presents for evaluation of the following:    Abdominal/Flank Pain  Onset/Duration: 2-3 days  Description:   Character: Dull ache  Location: right upper quadrant  Radiation: Back - right flank (started today)  Intensity: 5/10  Progression of Symptoms:  constant  Accompanying Signs & Symptoms:  Fever/chills: YES- low grade this morning, 99  Gas/Bloating: YES- more gas  Nausea: YES  Vomitting: no   Diarrhea: YES- mild this AM  Constipation:YES- In the last week, but not today  Dysuria: no            Hematuria: no            Frequency: no            Incontinence of urine: no   History:            Last bowel movement: today   Trauma: No  Previous similar pain: YES- But not as constant  Previous tests done: no           Previous Abdominal surgery: no   Precipitating factors:   Does the pain change with:     Food: no      Bowel Movement: YES- slightly improved    Urination: no              Other factors: YES- Heartburn was worse after eating this morning  Therapies tried and outcome:  None     When food last eaten: 730 am this morning    She  had left thyroid lobectomy & isthmusectomy on 1/21/25. Denies increased pain, drainage, swelling, or erythema at incision site.   She had been taking Tylenol for discomfort after her surgery, today is the first day she is not (so unsure if she had low grade fevers prior today).   Also had some mild generalized myalgias in arms and legs over the last couple of days. Denies URI symptoms, HA, urinary symptoms, SOB.  Right mid back/flank pain does not seem to be related to movement. No injury. Pain does not radiate into legs. No numbness/tingling or weakness.     Has GERD, takes Tums as needed.      Past Medical History:   Diagnosis Date    Allergic rhinosinusitis     Gastroesophageal reflux disease     Subclinical hyperthyroidism 05/01/2024    Thyroid nodule 05/01/2024    toxic adenoma left 4.4 cm       Vitals:    01/29/25 1105   BP: 135/86   BP Location: Right arm   Patient Position: Sitting   Cuff Size: Adult Regular   Pulse: 102   Resp: 17   Temp: 100  F (37.8  C)   TempSrc: Oral   SpO2: 98%   Weight: 77.7 kg (171 lb 3.2 oz)       Physical Exam  Vitals and nursing note reviewed.   Pulmonary:      Effort: Pulmonary effort is normal.   Abdominal:      General: Bowel sounds are normal. There is no distension.      Palpations: Abdomen is soft.      Tenderness: There is abdominal tenderness in the right upper quadrant. There is right CVA tenderness. There is no left CVA tenderness, guarding or rebound.   Neurological:      Mental Status: She is alert.         Labs/Imaging:  Results for orders placed or performed in visit on 01/29/25 (from the past 24 hours)   UA with Microscopic reflex to Culture    Specimen: Urine, Clean Catch   Result Value Ref Range    Color Urine Yellow Colorless, Straw, Light Yellow, Yellow    Appearance Urine Clear Clear    Glucose Urine Negative Negative mg/dL    Bilirubin Urine Negative Negative    Ketones Urine Negative Negative mg/dL    Specific Gravity Urine 1.010 1.005 - 1.030    Blood Urine  Trace (A) Negative    pH Urine 6.0 5.0 - 8.0    Protein Albumin Urine Negative Negative mg/dL    Urobilinogen Urine 0.2 0.2, 1.0 E.U./dL    Nitrite Urine Negative Negative    Leukocyte Esterase Urine Negative Negative   HCG qualitative urine   Result Value Ref Range    hCG Urine Qualitative Negative Negative   UA Microscopic with Reflex to Culture   Result Value Ref Range    Bacteria Urine None Seen None Seen /HPF    RBC Urine None Seen 0-2 /HPF /HPF    WBC Urine None Seen 0-5 /HPF /HPF    Squamous Epithelials Urine None Seen None Seen /LPF    Narrative    Urine Culture not indicated   Influenza A & B Antigen    Specimen: Nose; Swab   Result Value Ref Range    Influenza A antigen Negative Negative    Influenza B antigen Negative Negative    Narrative    Test results must be correlated with clinical data. If necessary, results should be confirmed by a molecular assay or viral culture.   CBC with platelets differential    Narrative    The following orders were created for panel order CBC with platelets differential.  Procedure                               Abnormality         Status                     ---------                               -----------         ------                     CBC with platelets and d...[[id number]]                      Final result                 Please view results for these tests on the individual orders.   Comprehensive metabolic panel   Result Value Ref Range    Sodium 144 135 - 145 mmol/L    Potassium 4.0 3.4 - 5.3 mmol/L    Chloride 113 (H) 94 - 109 mmol/L    Carbon Dioxide (CO2) 23 20 - 32 mmol/L    Anion Gap 8 3 - 14 mmol/L    Urea Nitrogen 9 7 - 30 mg/dL    Creatinine 0.60 0.52 - 1.04 mg/dL    GFR Estimate >90 >60 mL/min/1.73m2    Calcium 9.2 8.5 - 10.1 mg/dL    Glucose 96 70 - 99 mg/dL    Alkaline Phosphatase 58 40 - 150 U/L    AST 24 0 - 45 U/L    ALT 17 0 - 50 U/L    Protein Total 7.8 6.8 - 8.8 g/dL    Albumin 4.5 3.4 - 5.0 g/dL    Bilirubin Total 0.7 0.2 - 1.3 mg/dL   CBC with  platelets and differential   Result Value Ref Range    WBC Count 7.9 4.0 - 11.0 10e3/uL    RBC Count 4.83 3.80 - 5.20 10e6/uL    Hemoglobin 15.1 11.7 - 15.7 g/dL    Hematocrit 42.7 35.0 - 47.0 %    MCV 88 78 - 100 fL    MCH 31.3 26.5 - 33.0 pg    MCHC 35.4 31.5 - 36.5 g/dL    RDW 11.2 10.0 - 15.0 %    Platelet Count 313 150 - 450 10e3/uL    % Neutrophils 79 %    % Lymphocytes 16 %    % Monocytes 4 %    % Eosinophils 0 %    % Basophils 0 %    % Immature Granulocytes 0 %    Absolute Neutrophils 6.2 1.6 - 8.3 10e3/uL    Absolute Lymphocytes 1.3 0.8 - 5.3 10e3/uL    Absolute Monocytes 0.3 0.0 - 1.3 10e3/uL    Absolute Eosinophils 0.0 0.0 - 0.7 10e3/uL    Absolute Basophils 0.0 0.0 - 0.2 10e3/uL    Absolute Immature Granulocytes 0.0 <=0.4 10e3/uL     No results found for this or any previous visit (from the past 24 hours).        Patient Instructions   Consider a follow-up liver MRI through your primary care provider.   Suspect a viral illness in addition to possible muscle strain/spasm in your back.   Zofran for nausea as needed.   Ibuprofen/Tylenol as needed.    Caring for Your Back    You are not alone.    Low back pain is very common. Nearly half of all adults will have low back pain in any given year. The good news is that back pain is rarely a danger to your health. Most people can manage their back pain on their own. About half of people start feeling better within two weeks. In 9 out of 10 cases, low back pain goes away or no longer limits daily activity within 6 weeks.     Your outlook is good!     Your symptoms tell us that your low back pain is most likely not a danger to you. Most of the time we will not know the exact cause of low back pain, even if you see a doctor or have an MRI. However, treatment can still work without knowing the cause of the pain. Less than 1 in 100 people need surgery for their back pain.     What can I do about my low back pain?     There are three basic things you can do to ease low  back pain and help it go away.    Use heat or cold packs.   Take medicine as directed.   Use positions, movements and exercises.    Using heat or cold packs:    Try cold packs or gentle heat to ease your pain.  Use whichever gives you the most relief. Apply the cold pack or heat for 15 minutes at a time, as often as needed.    Taking medicine:    If taking over-the-counter medicine:   Take ibuprofen (Advil, Motrin) 600 mg three times a day as needed for pain.  OR   Take Aleve (naproxen) 220 to 440 mg two times a day as needed for pain. If your doctor prescribed a muscle relaxant (cyclobenzaprine 10 mg.):   Take   to 1 tablet at bedtime.   Do not drive when taking this medicine. This drug may make you sleepy.     Using positions, movements and exercises:    Research tells us that moving your joints and muscles can help you recover from back pain. Such activity should be simple and gentle. Use the positions below as well as walking to help relieve your pain. Try taking a short walk every 3 to 4 hours during the day. Walk for a few minutes inside your home or take longer walks outside, on a treadmill or at a mall. Slowly increase the amount of time you walk. Expect discomfort when you begin, but it should lessen as your back starts to heal. When your back feels better, walk daily to keep your back and body healthy.    Finding a position that is comfortable:    When your back pain is new, certain positions will ease your pain. Gently try each of the positions below until you find one that is helpful. Once you find a position of comfort, use it as often as you like when you are resting. You will recover faster if you combine rest with activity.    * Lie on your back with your legs bent. You can do this by placing a pillow under your knees or lie on the floor and rest your lower legs on the seat of a chair.  * Lie on your side with your knees bent and place a pillow between your knees.   Lie on your stomach over pillows.        When should I call my doctor?    Your back pain should improve over the first couple of weeks. As it improves, you should be able to return to your normal activities.  But call your doctor if:     You have a sudden change in your ability to control your bladder or bowels.   You begin to feel tingling in your groin or legs.   The pain spreads down your leg and into your foot.   Your toes, feet or leg muscles begin to feel weak.   You feel generally unwell or sick.   Your pain gets worse.    If you are deaf or hard of hearing, please let us know. We provide many free services including sign language interpreters, oral interpreters, TTYs, telephone amplifiers, note takers and written materials.    For informational purposes only. Not to replace the advice of your health care provider. Copyright   2013 [employer]. All rights reserved. [employer] [id number] - 04/13.

## 2025-01-29 NOTE — PATIENT INSTRUCTIONS
Consider a follow-up liver MRI through your primary care provider.   Suspect a viral illness in addition to possible muscle strain/spasm in your back.   Zofran for nausea as needed.   Ibuprofen/Tylenol as needed.    Caring for Your Back    You are not alone.    Low back pain is very common. Nearly half of all adults will have low back pain in any given year. The good news is that back pain is rarely a danger to your health. Most people can manage their back pain on their own. About half of people start feeling better within two weeks. In 9 out of 10 cases, low back pain goes away or no longer limits daily activity within 6 weeks.     Your outlook is good!     Your symptoms tell us that your low back pain is most likely not a danger to you. Most of the time we will not know the exact cause of low back pain, even if you see a doctor or have an MRI. However, treatment can still work without knowing the cause of the pain. Less than 1 in 100 people need surgery for their back pain.     What can I do about my low back pain?     There are three basic things you can do to ease low back pain and help it go away.    Use heat or cold packs.   Take medicine as directed.   Use positions, movements and exercises.    Using heat or cold packs:    Try cold packs or gentle heat to ease your pain.  Use whichever gives you the most relief. Apply the cold pack or heat for 15 minutes at a time, as often as needed.    Taking medicine:    If taking over-the-counter medicine:   Take ibuprofen (Advil, Motrin) 600 mg three times a day as needed for pain.  OR   Take Aleve (naproxen) 220 to 440 mg two times a day as needed for pain. If your doctor prescribed a muscle relaxant (cyclobenzaprine 10 mg.):   Take   to 1 tablet at bedtime.   Do not drive when taking this medicine. This drug may make you sleepy.     Using positions, movements and exercises:    Research tells us that moving your joints and muscles can help you recover from back pain.  Such activity should be simple and gentle. Use the positions below as well as walking to help relieve your pain. Try taking a short walk every 3 to 4 hours during the day. Walk for a few minutes inside your home or take longer walks outside, on a treadmill or at a mall. Slowly increase the amount of time you walk. Expect discomfort when you begin, but it should lessen as your back starts to heal. When your back feels better, walk daily to keep your back and body healthy.    Finding a position that is comfortable:    When your back pain is new, certain positions will ease your pain. Gently try each of the positions below until you find one that is helpful. Once you find a position of comfort, use it as often as you like when you are resting. You will recover faster if you combine rest with activity.    * Lie on your back with your legs bent. You can do this by placing a pillow under your knees or lie on the floor and rest your lower legs on the seat of a chair.  * Lie on your side with your knees bent and place a pillow between your knees.   Lie on your stomach over pillows.       When should I call my doctor?    Your back pain should improve over the first couple of weeks. As it improves, you should be able to return to your normal activities.  But call your doctor if:     You have a sudden change in your ability to control your bladder or bowels.   You begin to feel tingling in your groin or legs.   The pain spreads down your leg and into your foot.   Your toes, feet or leg muscles begin to feel weak.   You feel generally unwell or sick.   Your pain gets worse.    If you are deaf or hard of hearing, please let us know. We provide many free services including sign language interpreters, oral interpreters, TTYs, telephone amplifiers, note takers and written materials.    For informational purposes only. Not to replace the advice of your health care provider. Copyright   2013 [employer]. All rights  reserved. [employer] [id number] - 04/13.

## 2025-01-29 NOTE — PROGRESS NOTES
Acute and Diagnostic Services Clinic Visit    {PROVIDER CHARTING PREFERENCE:694772}    Kelby Mast is a 29 year old, presenting for the following health issues:  Abdominal Pain  {(!) Visit Details have not yet been documented.  Please enter Visit Details and then use this list to pull in documentation. (Optional):581173}  HPI     Abdominal/Flank Pain  Onset/Duration: 430am this morning  Description:   Character: Dull ache  Location: right upper quadrant  Radiation: Back - right flank   Intensity: 5/10  Progression of Symptoms:  improving and constant  Accompanying Signs & Symptoms:  Fever/chills: YES- low grade this morning, 99, no chills  Gas/Bloating: YES- more gas  Nausea: YES- slightly better than this morning  Vomitting: no   Diarrhea: YES- Mild today  Constipation:YES- In the last week, but not today  Dysuria: no            Hematuria: no            Frequency: no            Incontinence of urine: no   History:            Last bowel movement: today 830/9am  Trauma: No  Previous similar pain: YES- But not as constant. Low back irritation before.   Previous tests done: US done in May           Previous Abdominal surgery: no   Precipitating factors:   Does the pain change with:     Food: no      Bowel Movement: YES- slightly improved    Urination: no              Other factors: YES- Heartburn was worse after eating this morning.  Therapies tried and outcome:  None    When food last eaten: 730 am this morning      {ROS Picklists (Optional):213854}      Objective    /86 (BP Location: Right arm, Patient Position: Sitting, Cuff Size: Adult Regular)   Pulse 102   Temp 100  F (37.8  C) (Oral)   Resp 17   Wt 77.7 kg (171 lb 3.2 oz)   SpO2 98%   BMI 28.49 kg/m    Body mass index is 28.49 kg/m .  Physical Exam   {Exam List (Optional):024863}    {Diagnostic Test Results (Optional):062842}        Signed Electronically by: Em Walker PA-C  {Email feedback regarding this note to  [email or web address]   :[id number]}

## 2025-02-03 ENCOUNTER — TELEPHONE (OUTPATIENT)
Dept: FAMILY MEDICINE | Facility: CLINIC | Age: 30
End: 2025-02-03
Payer: COMMERCIAL

## 2025-02-03 ENCOUNTER — TELEPHONE (OUTPATIENT)
Dept: OTOLARYNGOLOGY | Facility: CLINIC | Age: 30
End: 2025-02-03
Payer: COMMERCIAL

## 2025-02-03 NOTE — TELEPHONE ENCOUNTER
S-(situation): Cold feeling in arms and legs    B-(background): recent thyroid surgery    A-(assessment): States she had this a bit during her recovery from her surgery but it seems to be more persistent now. States her arms and legs get a cold sensation. States there is some minor numbness and tingling but her motor functions remain intact. She has tried to warm up under blankets and in warm water, but the sensation persists.    R-(recommendations): She is not sure what to do for next steps. She wants to know if she should schedule a visit with her PCP or if she should reach out to her surgeons office.

## 2025-02-03 NOTE — TELEPHONE ENCOUNTER
[facility] Call Center    Phone Message    May a detailed message be left on voicemail: yes     Reason for Call: Other: Pt is requesting to speak with care team regarding some follow up questions after surgery. Please call to advise. Did confirm phone number. Thanks      Action Taken: Message routed to:  [facility] ENT     Travel Screening: Not Applicable     Date of Service:

## 2025-02-03 NOTE — TELEPHONE ENCOUNTER
Returned call to patient to reviewed post-op question. Patient agreeable with plan of care and will follow up with post-op appointment as scheduled.    Karma ACEVESN, RN

## 2025-02-06 NOTE — TELEPHONE ENCOUNTER
Left message for pt to call back to discuss if she still is having symptoms  From chart review, it appears that pt did talk to ENT post operatively

## 2025-02-06 NOTE — TELEPHONE ENCOUNTER
Patient calling back.    Relayed message below. She is now feeling better and symptoms have resolved. She declines an appointment at this time. She has a post-op appt with ENT on Monday. She will call back as needed.

## 2025-02-10 ENCOUNTER — VIRTUAL VISIT (OUTPATIENT)
Dept: OTOLARYNGOLOGY | Facility: CLINIC | Age: 30
End: 2025-02-10
Payer: COMMERCIAL

## 2025-02-10 DIAGNOSIS — E89.0 S/P PARTIAL THYROIDECTOMY: Primary | ICD-10-CM

## 2025-02-10 NOTE — LETTER
2/10/2025       RE: Kezia Loera  [address] [place] [address]     Dear Colleague,    Thank you for referring your patient, Kezia Loera, to the [facility] EAR NOSE AND THROAT CLINIC [place] at [facility]. Please see a copy of my visit note below.    This is a video visit  Video start time: 1 PM  Video end time 1:12 PM  An additional 10 minutes was spent in preparing the note and reviewing the chart for a total of 22 minutes  This is a 29-year-old female who underwent a left thyroid lobectomy and isthmusectomy for hyperfunctioning 3.6 cm thyroid nodule on January 21.    Since the surgery the patient denies any problems with voice quality inspiration or swallowing.  She has no current symptoms of hypo or hyperthyroidism.  She did describe some anterior shoulder pain on the left side recently but not related to the surgery.    Video visit the wound appears to be healing well there is no evidence of hematoma seroma or infection.  The Dermabond is still in place.    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    Plan:  I reviewed with the patient wound management will massage  I placed orders for her to have thyroid function test checked at 8 weeks postop  She will follow-up with her primary care physician hereafter    Dawn Alfred MD      Again, thank you for allowing me to participate in the care of your patient.      Sincerely,    Dawn Alfred MD

## 2025-02-11 NOTE — PROGRESS NOTES
This is a video visit  Video start time: 1 PM  Video end time 1:12 PM  An additional 10 minutes was spent in preparing the note and reviewing the chart for a total of 22 minutes  This is a 29-year-old female who underwent a left thyroid lobectomy and isthmusectomy for hyperfunctioning 3.6 cm thyroid nodule on January 21.    Since the surgery the patient denies any problems with voice quality inspiration or swallowing.  She has no current symptoms of hypo or hyperthyroidism.  She did describe some anterior shoulder pain on the left side recently but not related to the surgery.    Video visit the wound appears to be healing well there is no evidence of hematoma seroma or infection.  The Dermabond is still in place.    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    Plan:  I reviewed with the patient wound management will massage  I placed orders for her to have thyroid function test checked at 8 weeks postop  She will follow-up with her primary care physician hereafter    Dawn Alfred MD

## 2025-02-25 ENCOUNTER — TELEPHONE (OUTPATIENT)
Dept: FAMILY MEDICINE | Facility: CLINIC | Age: 30
End: 2025-02-25
Payer: COMMERCIAL

## 2025-02-25 NOTE — TELEPHONE ENCOUNTER
Advised that patient be seen in person to assess it.  Could be muscular   Left message for pt to call back. RN direct number provided.

## 2025-02-25 NOTE — TELEPHONE ENCOUNTER
Nurse Triage SBAR    Is this a 2nd Level Triage? NO    Situation: Patient complaining of left shoulder and neck pain     Background: Patient had a Left Thyroid Lobectomy on 1/21 and the pain started after the surgery.     Assessment: Patient left shoulder and neck pain comes and goes, it is mild. However, she wanted to make sure it is not something she should be seen for. She brought it up to her surgeon at post-op appointment, and they were not worried. She states she is a , and the pain gets worse with more movement. She also notes that it improves with rest. She reports NO chest pain or tightness, NO shortness of breath, NO difficulty breathing, NO dizziness/lightheadedness. She believes the pain is more muscle related but wants PCP input if she should be seen before her appointment on 4/16. RN instructed patient to be seen urgently if she develops chest pains or tightness, difficulty breathing, or dizziness. She agreed.     Protocol Recommended Disposition: Home Care   No disposition on file.    Recommendation: Routing patient question to pcp. Please advise

## 2025-02-26 NOTE — TELEPHONE ENCOUNTER
LVM and mychart msg sent. If pt calls back please assist in scheduling appointment with any open provider before her visit in April. Will be waiting for mychart msg as well.    Vee PRIETO MA

## 2025-02-27 NOTE — TELEPHONE ENCOUNTER
Upon chart review, pt was assisted with scheduling via [employer] message and is seeing Dr. Rojo 3/12/25. No further action needed, closing encounter.

## 2025-03-05 ENCOUNTER — OFFICE VISIT (OUTPATIENT)
Dept: FAMILY MEDICINE | Facility: CLINIC | Age: 30
End: 2025-03-05
Payer: COMMERCIAL

## 2025-03-05 VITALS
OXYGEN SATURATION: 97 % | WEIGHT: 167.2 LBS | HEART RATE: 86 BPM | RESPIRATION RATE: 12 BRPM | TEMPERATURE: 99.3 F | BODY MASS INDEX: 27.86 KG/M2 | HEIGHT: 65 IN | DIASTOLIC BLOOD PRESSURE: 78 MMHG | SYSTOLIC BLOOD PRESSURE: 130 MMHG

## 2025-03-05 DIAGNOSIS — M25.512 ARTHRALGIA OF LEFT ACROMIOCLAVICULAR JOINT: ICD-10-CM

## 2025-03-05 DIAGNOSIS — M54.2 ANTERIOR NECK PAIN: Primary | ICD-10-CM

## 2025-03-05 DIAGNOSIS — E05.10 TOXIC THYROID NODULE: ICD-10-CM

## 2025-03-05 PROCEDURE — G2211 COMPLEX E/M VISIT ADD ON: HCPCS | Performed by: FAMILY MEDICINE

## 2025-03-05 PROCEDURE — 90656 IIV3 VACC NO PRSV 0.5 ML IM: CPT | Performed by: FAMILY MEDICINE

## 2025-03-05 PROCEDURE — 99213 OFFICE O/P EST LOW 20 MIN: CPT | Mod: 25 | Performed by: FAMILY MEDICINE

## 2025-03-05 PROCEDURE — 1125F AMNT PAIN NOTED PAIN PRSNT: CPT | Performed by: FAMILY MEDICINE

## 2025-03-05 PROCEDURE — 90480 ADMN SARSCOV2 VAC 1/ONLY CMP: CPT | Performed by: FAMILY MEDICINE

## 2025-03-05 PROCEDURE — 90471 IMMUNIZATION ADMIN: CPT | Performed by: FAMILY MEDICINE

## 2025-03-05 PROCEDURE — 3078F DIAST BP <80 MM HG: CPT | Performed by: FAMILY MEDICINE

## 2025-03-05 PROCEDURE — 3075F SYST BP GE 130 - 139MM HG: CPT | Performed by: FAMILY MEDICINE

## 2025-03-05 PROCEDURE — 91320 SARSCV2 VAC 30MCG TRS-SUC IM: CPT | Performed by: FAMILY MEDICINE

## 2025-03-05 ASSESSMENT — PAIN SCALES - GENERAL: PAINLEVEL_OUTOF10: MODERATE PAIN (5)

## 2025-03-05 NOTE — PROGRESS NOTES
"  Assessment & Plan     (M54.2) Anterior neck pain  (primary encounter diagnosis)  (E05.10) Toxic thyroid nodule- S/p Lobectomy on 1/21/25  Comment: Gradual improvement is noted    Plan: Assurance given, monitoring advised  Follow-up as needed      (M25.512) Arthralgia of left acromioclavicular joint  Comment: consider AC joint sprain  Supportive care offered  Consider therapy if not better as anticipated        BMI  Estimated body mass index is 27.82 kg/m  as calculated from the following:    Height as of this encounter: 1.651 m (5' 5\").    Weight as of this encounter: 75.8 kg (167 lb 3.2 oz).   Weight management plan: Discussed healthy diet and exercise guidelines      The longitudinal plan of care for the diagnosis(es)/condition(s) as documented were addressed during this visit. Due to the added complexity in care, I will continue to support Kezia in the subsequent management and with ongoing continuity of care.    Subjective   Kezia is a 29-year-old female presenting with left shoulder pain that began approximately 3 weeks ago. The pain is consistent, though not always aggravated by movement. She denies any specific injury and notes that the pain started after her thyroid surgery. Kezia had a left thyroid lobectomy on January 21st and experiencing some pain in her neck where had the thyroid surgery. The pain has been improving over time, and there has been no worsening or swelling noted.            3/5/2025     9:48 AM   Additional Questions   Roomed by Clarke Yadav   Accompanied by Self     History of Present Illness      She is taking medications regularly.                      Objective    /78   Pulse 86   Temp 99.3  F (37.4  C) (Oral)   Resp 12   Ht 1.651 m (5' 5\")   Wt 75.8 kg (167 lb 3.2 oz)   LMP  (LMP Unknown)   SpO2 97%   BMI 27.82 kg/m    Body mass index is 27.82 kg/m .  Physical Exam   Neck: surgical scar , healing well, no palpable tenderness or swelling,   Left shoulder: Normal to " inspection, + mild forward impingement, + crossarm test suggestive of AC joint sprain            Signed Electronically by: Carmel Rojo MD

## 2025-03-18 ENCOUNTER — LAB (OUTPATIENT)
Dept: LAB | Facility: CLINIC | Age: 30
End: 2025-03-18
Payer: COMMERCIAL

## 2025-03-18 DIAGNOSIS — E89.0 S/P PARTIAL THYROIDECTOMY: ICD-10-CM

## 2025-03-18 LAB — TSH SERPL DL<=0.005 MIU/L-ACNC: 3.33 UIU/ML (ref 0.3–4.2)

## 2025-03-18 PROCEDURE — 84443 ASSAY THYROID STIM HORMONE: CPT

## 2025-03-18 PROCEDURE — 36415 COLL VENOUS BLD VENIPUNCTURE: CPT

## 2025-03-25 ENCOUNTER — ALLIED HEALTH/NURSE VISIT (OUTPATIENT)
Dept: FAMILY MEDICINE | Facility: CLINIC | Age: 30
End: 2025-03-25
Payer: COMMERCIAL

## 2025-03-25 DIAGNOSIS — Z30.9 ENCOUNTER FOR CONTRACEPTIVE MANAGEMENT, UNSPECIFIED TYPE: Primary | ICD-10-CM

## 2025-03-25 PROCEDURE — 99207 PR NO CHARGE NURSE ONLY: CPT

## 2025-03-25 PROCEDURE — 96372 THER/PROPH/DIAG INJ SC/IM: CPT | Performed by: FAMILY MEDICINE

## 2025-03-25 RX ADMIN — MEDROXYPROGESTERONE ACETATE 150 MG: 150 INJECTION, SUSPENSION INTRAMUSCULAR at 09:48

## 2025-04-15 ENCOUNTER — VIRTUAL VISIT (OUTPATIENT)
Dept: ENDOCRINOLOGY | Facility: CLINIC | Age: 30
End: 2025-04-15
Payer: COMMERCIAL

## 2025-04-15 VITALS — HEIGHT: 65 IN | BODY MASS INDEX: 27.82 KG/M2

## 2025-04-15 DIAGNOSIS — E89.0 H/O PARTIAL THYROIDECTOMY: Primary | ICD-10-CM

## 2025-04-15 PROBLEM — E05.10 TOXIC THYROID NODULE: Status: RESOLVED | Noted: 2024-06-26 | Resolved: 2025-04-15

## 2025-04-15 ASSESSMENT — PAIN SCALES - GENERAL: PAINLEVEL_OUTOF10: NO PAIN (0)

## 2025-04-15 NOTE — LETTER
4/15/2025       RE: Kezia Loera  [address] [place] [address]     Dear Colleague,    Thank you for referring your patient, Kezia Loera, to the [facility] ENDOCRINOLOGY CLINIC [place] at [facility]. Please see a copy of my visit note below.    03/26/25 9:42 AM  PATIENT LAB/IMAGING STATUS : No pending lab orders       Endocrine Video visit   Virtual Visit Details    Type of service:  Video Visit     Originating Location (pt. Location): Home    Attending Assessment/Plan :     S/p left thyroid lobectomy as treatment for toxic adenoma left thyroid .  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   I have counseled her that She should have thyroid checked in the event of pregnancy, and through pregnancy, given the half thyroid that remains.     Due to the continued risks of COVID 19 transmission and to improve ease of access this visit was a video visit.   The patient gave verbal consent for the visit today.    I have independently reviewed and interpreted labs, imaging as indicated.     Distant Location (provider location):  Off-site  Mode of Communication:  Video Conference via [employer]  Chart review/prep time 1  [id number] ; 1229   Visit Start time  1538  Visit Stop time  1541   20_ I spent minutes spent on the date of the encounter doing chart review, history and exam, documentation and further activities as noted above, exclusive of CGM reading time .  Lisa Kelly MD    Chief complaint: HISTORY OF PRESENT ILLNESS    Kezia returns to follow up on her history of subclinical hyperthyroidism/toxic adenoma now s/p left thyroid lobectomy. I have seen her once before on 6/26/24.  Since then, we initially treated with methimazole and more recently she had thyroid surgery.     Thyroid labs were checked during a preoperative exam for laparoscopic bilateral  salpingectomy.  She was noted to have left thyroid mass on exam.  Studies showed a toxic adenoma/ hyperthyroidism.  We started methimazole 8/15/24   1/21/25 left thyroid lobectomy and isthmusectomy .  Surgical path showed 3.6 cm oncocytic adenoma.  She has been off methimazole since surgery.  TSH was normal on 3/18/25     Today she says the operation went well .     Endocrine relevant labs are as follows:  5/1/24 TSH 0.03, free T4 1.32   5/21/24 TSH 0.03, free T4 1.27, T3 155, TSI < 1   6/26/24 TSh 0.29, free T4 1.0  7/25/24 TSH 0.19, free T4 1.08, T3 128   8/15/24 surgical referral; Methimazole 5 mg/day   9/14/24 TSH 1.19, free T4 0.95, T3 127   10/12/24 TSH 1.37, free T4 0.98, T3 123   11/14/24 TSh 1.51, free T4 0.96, T3 119  12/14/24 TSH 2.08,m free T4 1.05, T3 123  3/18/25 TSh 3.33    Relevant imaging is as follows: (as read by me as it pertains to endocrine relevant organs)  5/1/24 thyroid US:   Right lobe small  Left thyroid nodule 2.5 x 1.8 x 4.4 75% solid/mixed cystic ; grade 3 doppler   6/4/24 123I thyroid uptake scan: hot nodule left; complete suppression of rest of thyroid.  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       REVIEW OF SYSTEMS  Feels normal   Can't tell a big difference  Less fatigue  Less anxiety   Sleep OK  Cardiac: negative   Very occasional pain in the area of surgery  Migraines are less   No pregnancy plans now     Past Medical History  Past Medical History:   Diagnosis Date     Allergic rhinosinusitis      Gastroesophageal reflux disease      Subclinical hyperthyroidism 05/01/2024     Thyroid nodule 05/01/2024    toxic adenoma left 4.4 cm     Past Surgical History:   Procedure Laterality Date     EXCISE NODULE THYROID Left 1/21/2025    Procedure: Left thyroid lobectomy and isthmusectomy;  Surgeon: Dawn Alfred MD;  Location: [facility] OR       Medications  Current Outpatient Medications   Medication Sig Dispense Refill      "cetirizine (ZYRTEC) 5 MG tablet Take 10 mg by mouth daily       ondansetron (ZOFRAN ODT) 4 MG ODT tab Take 1 tablet (4 mg) by mouth every 8 hours as needed for nausea. 12 tablet 0     oxyCODONE (ROXICODONE) 5 MG tablet Take 1-2 tablets (5-10 mg) by mouth every 4 hours as needed for moderate to severe pain. (Patient not taking: Reported on 3/5/2025) 6 tablet 0     Allergies  Allergies   Allergen Reactions     Aspirin Other (See Comments)     stiffiness     Ibuprofen Other (See Comments)     stuffy     Family History  Family History   Problem Relation Age of Onset     Brain Cancer Paternal Grandfather      Anesthesia Reaction No family hx of      Thrombosis No family hx of      Thyroid Disease No family hx of      Nephrolithiasis No family hx of      Diabetes No family hx of      Social History  Social History     Tobacco Use     Smoking status: Former     Current packs/day: 0.00     Types: Cigarettes     Quit date:      Years since quittin.2     Smokeless tobacco: Never     Tobacco comments:     Occ vape with THC   Vaping Use     Vaping status: Every Day   Substance Use Topics     Alcohol use: Yes     Comment: rarely     Drug use: Yes     Types: Marijuana     GENERAL: no distress  BP Readings from Last 1 Encounters:   25 130/78      Pulse Readings from Last 1 Encounters:   25 86      Resp Readings from Last 1 Encounters:   25 12      Temp Readings from Last 1 Encounters:   25 99.3  F (37.4  C) (Oral)      SpO2 Readings from Last 1 Encounters:   25 97%      Wt Readings from Last 1 Encounters:   25 75.8 kg (167 lb 3.2 oz)      Ht Readings from Last 1 Encounters:   04/15/25 1.651 m (5' 5\")     SKIN: Visible skin clear. No significant rash, abnormal pigmentation or lesions.  EYES: Eyes grossly normal to inspection.    NECK: small low transverse surgical site looks clean, healed   RESP: No audible wheeze, cough, or increased work of breathing.    NEURO: Awake, alert, responds " "appropriately to questions.  Mentation and speech fluent.  PSYCH:affect normal,and appearance well-groomed.    DATA REVIEW    Surgical Pathology Exam: [id number]  Order: [id number]  Collected 1/21/2025  9:12 AM       Status: Final result       Visible to patient: Yes (seen)    0 Result Notes        Component  Resulting Agency    Case Report    Surgical Pathology Report                         Case: [id number]                                    Authorizing Provider:  Dawn Alfred MD   Collected:           01/21/2025 09:12 AM            Ordering Location:     [facility] OR  Received:            01/21/2025 09:26 AM                                   [place]                                                                    Pathologist:           Hanny Barnes MD                                                                            Specimen:    Thyroid, left, left lobe of thyroid and isthmus                                              Final Diagnosis    A. THYROID, LEFT THYROID AND ISTHMUS, LEFT HEMITHYROIDECTOMY:  - Oncocytic adenoma, 3.6 cm  - Prior procedure site changes    Electronically signed by Hanny Barnes MD on 1/24/2025 at  4:48 PM    Clinical Information  [facility]    Procedure: Left thyroid lobectomy and isthmusectomy - Left  Pre-op Diagnosis: Toxic thyroid nodule [E05.10]    Gross Description  [facility]    A(1). Thyroid, left, left lobe of thyroid and isthmus:  The specimen is received in formalin with proper patient identification, labeled \"left lobe of thyroid and isthmus\".  The specimen consists of a 16.8 g, 4.5 cm (superior to inferior) by 2.9 cm (anterior to posterior) by 2.8 cm (transversely) left thyroidectomy.  The specimen is inked as follows: Anterior-blue, posterior-black, and isthmus margin-orange.  The thyroid capsule is smooth and intact.  The thyroid " lobe is sectioned from superior to inferior revealing a 3.6 x 3.1 x 2.3 cm cystic tan-pink nodule which occupies the majority of the thyroid lobe.  The nodule appears encapsulated, it abuts the anterior and posterior thyroid capsule, and is 0.8 cm from the isthmus margin.  The remainder of the cut surfaces are red-brown and glistening.  Representative tissue including the entire nodule capsule is submitted as follows:  Cassette summary:  A1-A9: Nodule with capsule, with entire capsule, superior to inferior  A10: Uninvolved thyroid lobe with isthmus margin closest to nodule    Microscopic Description  [facility]    Microscopic examination was performed.     I have personally reviewed all specimens and or slides, including the listed special stains, and used them with my medical judgement to determine the final diagnosis.       Performing Labs  [facility]    The technical component of this testing was completed at [facility].     Stain controls for all stains resulted within this report have been reviewed and show appropriate reactivity.                  Specimen Collected: 01/21/25  9:12 AM Last Resulted: 01/24/25  4:48 PM         Again, thank you for allowing me to participate in the care of your patient.      Sincerely,    Lisa Kelly MD

## 2025-04-15 NOTE — PATIENT INSTRUCTIONS
You may return to your primary care provider and see us only if needed    You should have your thyroid checked on diagnosis of pregnancy and throughout pregnancy.

## 2025-04-15 NOTE — NURSING NOTE
Current patient location: [address] [place] [address]    Is the patient currently in the state of [place]? YES    Visit mode: VIDEO    If the visit is dropped, the patient can be reconnected by:VIDEO VISIT: Text to cell phone:   Telephone Information:   Mobile [phone]       Will anyone else be joining the visit? NO  (If patient encounters technical issues they should call [phone]    Are changes needed to the allergy or medication list? No    Are refills needed on medications prescribed by this physician? NO    Rooming Documentation:  Questionnaire(s) completed    Reason for visit: ANDREW WHITMANF

## 2025-04-15 NOTE — PROGRESS NOTES
Endocrine Video visit   Virtual Visit Details    Type of service:  Video Visit     Originating Location (pt. Location): Home    Attending Assessment/Plan :     S/p left thyroid lobectomy as treatment for toxic adenoma left thyroid .  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   I have counseled her that She should have thyroid checked in the event of pregnancy, and through pregnancy, given the half thyroid that remains.     Due to the continued risks of COVID 19 transmission and to improve ease of access this visit was a video visit.   The patient gave verbal consent for the visit today.    I have independently reviewed and interpreted labs, imaging as indicated.     Distant Location (provider location):  Off-site  Mode of Communication:  Video Conference via [employer]  Chart review/prep time 1  [id number] ; 1229   Visit Start time  1538  Visit Stop time  1541   20_ I spent minutes spent on the date of the encounter doing chart review, history and exam, documentation and further activities as noted above, exclusive of CGM reading time .  Lisa Kelly MD    Chief complaint: HISTORY OF PRESENT ILLNESS    Kezia returns to follow up on her history of subclinical hyperthyroidism/toxic adenoma now s/p left thyroid lobectomy. I have seen her once before on 6/26/24.  Since then, we initially treated with methimazole and more recently she had thyroid surgery.     Thyroid labs were checked during a preoperative exam for laparoscopic bilateral salpingectomy.  She was noted to have left thyroid mass on exam.  Studies showed a toxic adenoma/ hyperthyroidism.  We started methimazole 8/15/24   1/21/25 left thyroid lobectomy and isthmusectomy .  Surgical path showed 3.6 cm oncocytic adenoma.  She has been off methimazole since surgery.  TSH was normal on 3/18/25     Today she says the operation went well .     Endocrine relevant labs  are as follows:  5/1/24 TSH 0.03, free T4 1.32   5/21/24 TSH 0.03, free T4 1.27, T3 155, TSI < 1   6/26/24 TSh 0.29, free T4 1.0  7/25/24 TSH 0.19, free T4 1.08, T3 128   8/15/24 surgical referral; Methimazole 5 mg/day   9/14/24 TSH 1.19, free T4 0.95, T3 127   10/12/24 TSH 1.37, free T4 0.98, T3 123   11/14/24 TSh 1.51, free T4 0.96, T3 119  12/14/24 TSH 2.08,m free T4 1.05, T3 123  3/18/25 TSh 3.33    Relevant imaging is as follows: (as read by me as it pertains to endocrine relevant organs)  5/1/24 thyroid US:   Right lobe small  Left thyroid nodule 2.5 x 1.8 x 4.4 75% solid/mixed cystic ; grade 3 doppler   6/4/24 123I thyroid uptake scan: hot nodule left; complete suppression of rest of thyroid.  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       REVIEW OF SYSTEMS  Feels normal   Can't tell a big difference  Less fatigue  Less anxiety   Sleep OK  Cardiac: negative   Very occasional pain in the area of surgery  Migraines are less   No pregnancy plans now     Past Medical History  Past Medical History:   Diagnosis Date    Allergic rhinosinusitis     Gastroesophageal reflux disease     Subclinical hyperthyroidism 05/01/2024    Thyroid nodule 05/01/2024    toxic adenoma left 4.4 cm     Past Surgical History:   Procedure Laterality Date    EXCISE NODULE THYROID Left 1/21/2025    Procedure: Left thyroid lobectomy and isthmusectomy;  Surgeon: Dawn Alfred MD;  Location: [facility] OR       Medications  Current Outpatient Medications   Medication Sig Dispense Refill    cetirizine (ZYRTEC) 5 MG tablet Take 10 mg by mouth daily      ondansetron (ZOFRAN ODT) 4 MG ODT tab Take 1 tablet (4 mg) by mouth every 8 hours as needed for nausea. 12 tablet 0    oxyCODONE (ROXICODONE) 5 MG tablet Take 1-2 tablets (5-10 mg) by mouth every 4 hours as needed for moderate to severe pain. (Patient not taking: Reported on 3/5/2025) 6 tablet 0     Allergies  Allergies   Allergen  "Reactions    Aspirin Other (See Comments)     stiffiness    Ibuprofen Other (See Comments)     stuffy     Family History  Family History   Problem Relation Age of Onset    Brain Cancer Paternal Grandfather     Anesthesia Reaction No family hx of     Thrombosis No family hx of     Thyroid Disease No family hx of     Nephrolithiasis No family hx of     Diabetes No family hx of      Social History  Social History     Tobacco Use    Smoking status: Former     Current packs/day: 0.00     Types: Cigarettes     Quit date:      Years since quittin.2    Smokeless tobacco: Never    Tobacco comments:     Occ vape with THC   Vaping Use    Vaping status: Every Day   Substance Use Topics    Alcohol use: Yes     Comment: rarely    Drug use: Yes     Types: Marijuana     GENERAL: no distress  BP Readings from Last 1 Encounters:   25 130/78      Pulse Readings from Last 1 Encounters:   25 86      Resp Readings from Last 1 Encounters:   25 12      Temp Readings from Last 1 Encounters:   25 99.3  F (37.4  C) (Oral)      SpO2 Readings from Last 1 Encounters:   25 97%      Wt Readings from Last 1 Encounters:   25 75.8 kg (167 lb 3.2 oz)      Ht Readings from Last 1 Encounters:   04/15/25 1.651 m (5' 5\")     SKIN: Visible skin clear. No significant rash, abnormal pigmentation or lesions.  EYES: Eyes grossly normal to inspection.    NECK: small low transverse surgical site looks clean, healed   RESP: No audible wheeze, cough, or increased work of breathing.    NEURO: Awake, alert, responds appropriately to questions.  Mentation and speech fluent.  PSYCH:affect normal,and appearance well-groomed.    DATA REVIEW    Surgical Pathology Exam: [id number]  Order: [id number]  Collected 2025  9:12 AM       Status: Final result       Visible to patient: Yes (seen)    0 Result Notes        Component  Resulting Agency    Case Report    Surgical Pathology Report                         Case: [id number]    " "                                Authorizing Provider:  Dawn Alfred MD   Collected:           01/21/2025 09:12 AM            Ordering Location:     [facility] OR  Received:            01/21/2025 09:26 AM                                   [place]                                                                    Pathologist:           Hanny Barnes MD                                                                            Specimen:    Thyroid, left, left lobe of thyroid and isthmus                                              Final Diagnosis    A. THYROID, LEFT THYROID AND ISTHMUS, LEFT HEMITHYROIDECTOMY:  - Oncocytic adenoma, 3.6 cm  - Prior procedure site changes    Electronically signed by Hanny Barnes MD on 1/24/2025 at  4:48 PM    Clinical Information  [facility]    Procedure: Left thyroid lobectomy and isthmusectomy - Left  Pre-op Diagnosis: Toxic thyroid nodule [E05.10]    Gross Description  [facility]    A(1). Thyroid, left, left lobe of thyroid and isthmus:  The specimen is received in formalin with proper patient identification, labeled \"left lobe of thyroid and isthmus\".  The specimen consists of a 16.8 g, 4.5 cm (superior to inferior) by 2.9 cm (anterior to posterior) by 2.8 cm (transversely) left thyroidectomy.  The specimen is inked as follows: Anterior-blue, posterior-black, and isthmus margin-orange.  The thyroid capsule is smooth and intact.  The thyroid lobe is sectioned from superior to inferior revealing a 3.6 x 3.1 x 2.3 cm cystic tan-pink nodule which occupies the majority of the thyroid lobe.  The nodule appears encapsulated, it abuts the anterior and posterior thyroid capsule, and is 0.8 cm from the isthmus margin.  The remainder of the cut surfaces are red-brown and glistening.  Representative tissue including the entire nodule capsule is submitted as " follows:  Cassette summary:  A1-A9: Nodule with capsule, with entire capsule, superior to inferior  A10: Uninvolved thyroid lobe with isthmus margin closest to nodule    Microscopic Description  [facility]    Microscopic examination was performed.     I have personally reviewed all specimens and or slides, including the listed special stains, and used them with my medical judgement to determine the final diagnosis.       Performing Labs  [facility]    The technical component of this testing was completed at [facility].     Stain controls for all stains resulted within this report have been reviewed and show appropriate reactivity.                  Specimen Collected: 01/21/25  9:12 AM Last Resulted: 01/24/25  4:48 PM

## 2025-04-16 ENCOUNTER — OFFICE VISIT (OUTPATIENT)
Dept: FAMILY MEDICINE | Facility: CLINIC | Age: 30
End: 2025-04-16
Payer: COMMERCIAL

## 2025-04-16 VITALS
BODY MASS INDEX: 26.58 KG/M2 | RESPIRATION RATE: 16 BRPM | OXYGEN SATURATION: 98 % | HEIGHT: 66 IN | WEIGHT: 165.4 LBS | HEART RATE: 93 BPM | SYSTOLIC BLOOD PRESSURE: 108 MMHG | TEMPERATURE: 98.8 F | DIASTOLIC BLOOD PRESSURE: 72 MMHG

## 2025-04-16 DIAGNOSIS — K21.9 GASTROESOPHAGEAL REFLUX DISEASE WITHOUT ESOPHAGITIS: ICD-10-CM

## 2025-04-16 DIAGNOSIS — M89.9 BONE LESION: ICD-10-CM

## 2025-04-16 DIAGNOSIS — Z30.09 BIRTH CONTROL COUNSELING: ICD-10-CM

## 2025-04-16 DIAGNOSIS — R19.7 DIARRHEA, UNSPECIFIED TYPE: ICD-10-CM

## 2025-04-16 DIAGNOSIS — Z11.59 NEED FOR HEPATITIS C SCREENING TEST: ICD-10-CM

## 2025-04-16 DIAGNOSIS — Z00.00 ROUTINE GENERAL MEDICAL EXAMINATION AT A HEALTH CARE FACILITY: Primary | ICD-10-CM

## 2025-04-16 DIAGNOSIS — R10.84 ABDOMINAL PAIN, GENERALIZED: ICD-10-CM

## 2025-04-16 DIAGNOSIS — Z13.220 SCREENING FOR HYPERLIPIDEMIA: ICD-10-CM

## 2025-04-16 DIAGNOSIS — E05.10 TOXIC THYROID NODULE: ICD-10-CM

## 2025-04-16 DIAGNOSIS — K59.00 CONSTIPATION, UNSPECIFIED CONSTIPATION TYPE: ICD-10-CM

## 2025-04-16 DIAGNOSIS — Z11.4 SCREENING FOR HIV (HUMAN IMMUNODEFICIENCY VIRUS): ICD-10-CM

## 2025-04-16 LAB
CHOLEST SERPL-MCNC: 102 MG/DL
FASTING STATUS PATIENT QL REPORTED: NO
HCV AB SERPL QL IA: NONREACTIVE
HDLC SERPL-MCNC: 37 MG/DL
HIV 1+2 AB+HIV1 P24 AG SERPL QL IA: NONREACTIVE
LDLC SERPL CALC-MCNC: 54 MG/DL
NONHDLC SERPL-MCNC: 65 MG/DL
TRIGL SERPL-MCNC: 56 MG/DL

## 2025-04-16 PROCEDURE — 86364 TISS TRNSGLTMNASE EA IG CLAS: CPT

## 2025-04-16 PROCEDURE — 1126F AMNT PAIN NOTED NONE PRSNT: CPT

## 2025-04-16 PROCEDURE — 86803 HEPATITIS C AB TEST: CPT

## 2025-04-16 PROCEDURE — 36415 COLL VENOUS BLD VENIPUNCTURE: CPT

## 2025-04-16 PROCEDURE — 99214 OFFICE O/P EST MOD 30 MIN: CPT | Mod: 25

## 2025-04-16 PROCEDURE — 80061 LIPID PANEL: CPT

## 2025-04-16 PROCEDURE — 99395 PREV VISIT EST AGE 18-39: CPT

## 2025-04-16 PROCEDURE — 3078F DIAST BP <80 MM HG: CPT

## 2025-04-16 PROCEDURE — 3074F SYST BP LT 130 MM HG: CPT

## 2025-04-16 PROCEDURE — 87389 HIV-1 AG W/HIV-1&-2 AB AG IA: CPT

## 2025-04-16 RX ORDER — PANTOPRAZOLE SODIUM 20 MG/1
20 TABLET, DELAYED RELEASE ORAL DAILY
Qty: 50 TABLET | Refills: 0 | Status: SHIPPED | OUTPATIENT
Start: 2025-04-16

## 2025-04-16 SDOH — HEALTH STABILITY: PHYSICAL HEALTH: ON AVERAGE, HOW MANY DAYS PER WEEK DO YOU ENGAGE IN MODERATE TO STRENUOUS EXERCISE (LIKE A BRISK WALK)?: 5 DAYS

## 2025-04-16 SDOH — HEALTH STABILITY: PHYSICAL HEALTH: ON AVERAGE, HOW MANY MINUTES DO YOU ENGAGE IN EXERCISE AT THIS LEVEL?: 20 MIN

## 2025-04-16 ASSESSMENT — SOCIAL DETERMINANTS OF HEALTH (SDOH): HOW OFTEN DO YOU GET TOGETHER WITH FRIENDS OR RELATIVES?: ONCE A WEEK

## 2025-04-16 ASSESSMENT — PAIN SCALES - GENERAL: PAINLEVEL_OUTOF10: NO PAIN (0)

## 2025-04-16 NOTE — PATIENT INSTRUCTIONS
Patient Education   Preventive Care Advice   This is general advice given by our system to help you stay healthy. However, your care team may have specific advice just for you. Please talk to your care team about your preventive care needs.  Nutrition  Eat 5 or more servings of fruits and vegetables each day.  Try wheat bread, brown rice and whole grain pasta (instead of white bread, rice, and pasta).  Get enough calcium and vitamin D. Check the label on foods and aim for 100% of the RDA (recommended daily allowance).  Lifestyle  Exercise at least 150 minutes each week  (30 minutes a day, 5 days a week).  Do muscle strengthening activities 2 days a week. These help control your weight and prevent disease.  No smoking.  Wear sunscreen to prevent skin cancer.  Have a dental exam and cleaning every 6 months.  Yearly exams  See your health care team every year to talk about:  Any changes in your health.  Any medicines your care team has prescribed.  Preventive care, family planning, and ways to prevent chronic diseases.  Shots (vaccines)   HPV shots (up to age 26), if you've never had them before.  Hepatitis B shots (up to age 59), if you've never had them before.  COVID-19 shot: Get this shot when it's due.  Flu shot: Get a flu shot every year.  Tetanus shot: Get a tetanus shot every 10 years.  Pneumococcal, hepatitis A, and RSV shots: Ask your care team if you need these based on your risk.  Shingles shot (for age 50 and up)  General health tests  Diabetes screening:  Starting at age 35, Get screened for diabetes at least every 3 years.  If you are younger than age 35, ask your care team if you should be screened for diabetes.  Cholesterol test: At age 39, start having a cholesterol test every 5 years, or more often if advised.  Bone density scan (DEXA): At age 50, ask your care team if you should have this scan for osteoporosis (brittle bones).  Hepatitis C: Get tested at least once in your life.  STIs (sexually  transmitted infections)  Before age 24: Ask your care team if you should be screened for STIs.  After age 24: Get screened for STIs if you're at risk. You are at risk for STIs (including HIV) if:  You are sexually active with more than one person.  You don't use condoms every time.  You or a partner was diagnosed with a sexually transmitted infection.  If you are at risk for HIV, ask about PrEP medicine to prevent HIV.  Get tested for HIV at least once in your life, whether you are at risk for HIV or not.  Cancer screening tests  Cervical cancer screening: If you have a cervix, begin getting regular cervical cancer screening tests starting at age 21.  Breast cancer scan (mammogram): If you've ever had breasts, begin having regular mammograms starting at age 40. This is a scan to check for breast cancer.  Colon cancer screening: It is important to start screening for colon cancer at age 45.  Have a colonoscopy test every 10 years (or more often if you're at risk) Or, ask your provider about stool tests like a FIT test every year or Cologuard test every 3 years.  To learn more about your testing options, visit:   .  For help making a decision, visit:   https://bit.ly/jr84678.  Prostate cancer screening test: If you have a prostate, ask your care team if a prostate cancer screening test (PSA) at age 55 is right for you.  Lung cancer screening: If you are a current or former smoker ages 50 to 80, ask your care team if ongoing lung cancer screenings are right for you.  For informational purposes only. Not to replace the advice of your health care provider. Copyright   2023 [facility]. All rights reserved. Clinically reviewed by the [facility]itions [facility]. [employer] 647474 - REV 01/24.  Learning About Stress  What is stress?     Stress is your body's response to a hard situation. Your body can have a physical, emotional, or mental response. Stress is a fact of life for most people, and it  affects everyone differently. What causes stress for you may not be stressful for someone else.  A lot of things can cause stress. You may feel stress when you go on a job interview, take a test, or run a race. This kind of short-term stress is normal and even useful. It can help you if you need to work hard or react quickly. For example, stress can help you finish an important job on time.  Long-term stress is caused by ongoing stressful situations or events. Examples of long-term stress include long-term health problems, ongoing problems at work, or conflicts in your family. Long-term stress can harm your health.  How does stress affect your health?  When you are stressed, your body responds as though you are in danger. It makes hormones that speed up your heart, make you breathe faster, and give you a burst of energy. This is called the fight-or-flight stress response. If the stress is over quickly, your body goes back to normal and no harm is done.  But if stress happens too often or lasts too long, it can have bad effects. Long-term stress can make you more likely to get sick, and it can make symptoms of some diseases worse. If you tense up when you are stressed, you may develop neck, shoulder, or low back pain. Stress is linked to high blood pressure and heart disease.  Stress also harms your emotional health. It can make you chamberlain, tense, or depressed. Your relationships may suffer, and you may not do well at work or school.  What can you do to manage stress?  You can try these things to help manage stress:   Do something active. Exercise or activity can help reduce stress. Walking is a great way to get started. Even everyday activities such as housecleaning or yard work can help.  Try yoga or tyree chi. These techniques combine exercise and meditation. You may need some training at first to learn them.  Do something you enjoy. For example, listen to music or go to a movie. Practice your hobby or do volunteer  "work.  Meditate. This can help you relax, because you are not worrying about what happened before or what may happen in the future.  Do guided imagery. Imagine yourself in any setting that helps you feel calm. You can use online videos, books, or a teacher to guide you.  Do breathing exercises. For example:  From a standing position, bend forward from the waist with your knees slightly bent. Let your arms dangle close to the floor.  Breathe in slowly and deeply as you return to a standing position. Roll up slowly and lift your head last.  Hold your breath for just a few seconds in the standing position.  Breathe out slowly and bend forward from the waist.  Let your feelings out. Talk, laugh, cry, and express anger when you need to. Talking with supportive friends or family, a counselor, or a ashlyn leader about your feelings is a healthy way to relieve stress. Avoid discussing your feelings with people who make you feel worse.  Write. It may help to write about things that are bothering you. This helps you find out how much stress you feel and what is causing it. When you know this, you can find better ways to cope.  What can you do to prevent stress?  You might try some of these things to help prevent stress:  Manage your time. This helps you find time to do the things you want and need to do.  Get enough sleep. Your body recovers from the stresses of the day while you are sleeping.  Get support. Your family, friends, and community can make a difference in how you experience stress.  Limit your news feed. Avoid or limit time on social media or news that may make you feel stressed.  Do something active. Exercise or activity can help reduce stress. Walking is a great way to get started.  Where can you learn more?  Go to https://www.Snapsheet.net/patiented  Enter N032 in the search box to learn more about \"Learning About Stress.\"  Current as of: October 24, 2024  Content Version: 14.4 2024-2025 [employer].   Care instructions adapted under license by your healthcare professional. If you have questions about a medical condition or this instruction, always ask your healthcare professional. [employer] disclaims any warranty or liability for your use of this information.    Substance Use Disorder: Care Instructions  Overview     You can improve your life and health by stopping your use of alcohol or drugs. When you don't drink or use drugs, you may feel and sleep better. You may get along better with your family, friends, and coworkers. There are medicines and programs that can help with substance use disorder.  How can you care for yourself at home?  Here are some ways to help you stay sober and prevent relapse.  If you have been given medicine to help keep you sober or reduce your cravings, be sure to take it exactly as prescribed.  Talk to your doctor about programs that can help you stop using drugs or drinking alcohol.  Do not keep alcohol or drugs in your home.  Plan ahead. Think about what you'll say if other people ask you to drink or use drugs. Try not to spend time with people who drink or use drugs.  Use the time and money spent on drinking or drugs to do something that's important to you.  Preventing a relapse  Have a plan to deal with relapse. Learn to recognize changes in your thinking that lead you to drink or use drugs. Get help before you start to drink or use drugs again.  Try to stay away from situations, friends, or places that may lead you to drink or use drugs.  If you feel the need to drink alcohol or use drugs again, seek help right away. Call a trusted friend or family member. Some people get support from organizations such as Narcotics Anonymous or k[employer]on or from treatment facilities.  If you relapse, get help as soon as you can. Some people make a plan with another person that outlines what they want that person to do for them if they relapse. The plan usually includes  how to handle the relapse and who to notify in case of relapse.  Don't give up. Remember that a relapse doesn't mean that you have failed. Use the experience to learn the triggers that lead you to drink or use drugs. Then quit again. Recovery is a lifelong process. Many people have several relapses before they are able to quit for good.  Follow-up care is a key part of your treatment and safety. Be sure to make and go to all appointments, and call your doctor if you are having problems. It's also a good idea to know your test results and keep a list of the medicines you take.  When should you call for help?   Call 911  anytime you think you may need emergency care. For example, call if you or someone else:    Has overdosed or has withdrawal signs. Be sure to tell the emergency workers that you are or someone else is using or trying to quit using drugs. Overdose or withdrawal signs may include:  Losing consciousness.  Seizure.  Seeing or hearing things that aren't there (hallucinations).     Is thinking or talking about suicide or harming others.   Where to get help 24 hours a day, 7 days a week   If you or someone you know talks about suicide, self-harm, a mental health crisis, a substance use crisis, or any other kind of emotional distress, get help right away. You can:    Call the Suicide and Crisis Lifeline at 389.     Call 9-813-773-TALK (1-526.648.4269).     Text HOME to 806274 to access the Crisis Text Line.   Consider saving these numbers in your phone.  Go to [employer] for more information or to chat online.  Call your doctor now or seek immediate medical care if:    You are having withdrawal symptoms. These may include nausea or vomiting, sweating, shakiness, and anxiety.   Watch closely for changes in your health, and be sure to contact your doctor if:    You have a relapse.     You need more help or support to stop.   Where can you learn more?  Go to https://www.healthwise.net/patiented  Enter H573  "in the search box to learn more about \"Substance Use Disorder: Care Instructions.\"  Current as of: August 20, 2024  Content Version: 14.4 2024-2025 [employer]   Care instructions adapted under license by your healthcare professional. If you have questions about a medical condition or this instruction, always ask your healthcare professional. [employer] disclaims any warranty or liability for your use of this information.       "

## 2025-04-16 NOTE — PROGRESS NOTES
Preventive Care Visit  [facility]  Beena Morrissey, NHI CNP, Family Medicine  Apr 16, 2025  {Provider  Link to [facility] :[id number]}    Assessment & Plan     Routine general medical examination at a health care facility  On exam, pleasant 29 year old patient. History of   Past Medical History:   Diagnosis Date    Allergic rhinosinusitis     Gastroesophageal reflux disease     Subclinical hyperthyroidism 05/01/2024    Thyroid disease 05/01/2023    Toxic thyroid nodule    Thyroid nodule 05/01/2024    toxic adenoma left 4.4 cm   No acute or concerning findings on today's physical exam. Vital signs at goal.      Screening for HIV (human immunodeficiency virus)  - HIV Antigen Antibody Combo; Future    Need for hepatitis C screening test  - Hepatitis C Screen Reflex to HCV RNA Quant and Genotype; Future    Screening for hyperlipidemia  - Lipid Profile (Chol, Trig, HDL, LDL calc); Future    Abdominal pain, generalized    - pantoprazole (PROTONIX) 20 MG EC tablet; Take 1 tablet (20 mg) by mouth daily.  - Calprotectin Feces; Future  - Tissue transglutaminase fabian IgA and IgG; Future    Diarrhea, unspecified type  Constipation, unspecified constipation type  ***  - Calprotectin Feces; Future  - Tissue transglutaminase fabian IgA and IgG; Future    Gastroesophageal reflux disease without esophagitis  ***  - pantoprazole (PROTONIX) 20 MG EC tablet; Take 1 tablet (20 mg) by mouth daily.    {Patient advised of split billing (Optional):020452}        Counseling  Appropriate preventive services were addressed with this patient via screening, questionnaire, or discussion as appropriate for fall prevention, nutrition, physical activity, Tobacco-use cessation, social engagement, weight loss and cognition.  Checklist reviewing preventive services available has been given to the patient.  Reviewed patient's diet, addressing concerns and/or questions.   The patient was instructed to see the dentist every 6 months.    She is at risk for psychosocial distress and has been provided with information to reduce risk.       {Follow-up (Optional):178707}    Subjective   Kezia is a 29 year old, presenting for the following:  Physical (Physical today. Patient states she had thyroid surgery on 1/21/25. She is having heartburn that got worse a week after surgery, stomach cramping and GI issues started before surgery but seems to persist over the last several months. )        4/16/2025     8:45 AM   Additional Questions   Roomed by María Elena NGO MA   Accompanied by Self        HPI       Kezia Loera, 29-year-old female.    Reports experiencing heartburn and abdominal pain since her surgery in January. The heartburn was present before the surgery and worsened afterward. The abdominal pain was most severe about a month before the surgery. She had a CT scan at urgent care, which showed no abnormalities. She has been managing her symptoms with omeprazole. Kezia has been experiencing nausea associated with heartburn and abdominal pain, she tried zofran but it made symptoms worse and she felt like she was going to pass out.     Kezia also describes pain in the shoulder area, which starts in the shoulder joint and extends into the armpit on the left side, and has been persistent since the surgery. It is getting better, thought maybe positioning secondary to the surgery. No loss of range of motion, no numbness or tingling, and no weakness.     She has a history of subclinical hyperthyroidism and a thyroid nodule, with the left side of her thyroid removed in January. Her thyroid levels were checked in March and were reported as normal.     She is vegetarian and does not believe she has any diet sensitivities.     Kezia quit smoking in 2016 and has not vaped THC since January.     For birth control, Kezia is on depo-provera.     Advance Care Planning        4/16/2025   General Health   How would you rate your overall physical health? Good    Feel stress (tense, anxious, or unable to sleep) To some extent   (!) STRESS CONCERN      2025   Nutrition   Three or more servings of calcium each day? Yes   Diet: Vegetarian/vegan   How many servings of fruit and vegetables per day? (!) 2-3   How many sweetened beverages each day? 0-1         2025   Exercise   Days per week of moderate/strenous exercise 5 days   Average minutes spent exercising at this level 20 min         2025   Social Factors   Frequency of gathering with friends or relatives Once a week   Worry food won't last until get money to buy more No   Food not last or not have enough money for food? No   Do you have housing? (Housing is defined as stable permanent housing and does not include staying ouside in a car, in a tent, in an abandoned building, in an overnight shelter, or couch-surfing.) Yes   Are you worried about losing your housing? No   Lack of transportation? No   Unable to get utilities (heat,electricity)? No         2025   Dental   Dentist two times every year? (!) NO       Today's PHQ-2 Score:       4/15/2025     3:27 PM   PHQ-2 (  [employer])   Q1: Little interest or pleasure in doing things 0   Q2: Feeling down, depressed or hopeless 0   PHQ-2 Score 0         2025   Substance Use   Alcohol more than 3/day or more than 7/wk No   Do you use any other substances recreationally? (!) CANNABIS PRODUCTS     Social History     Tobacco Use    Smoking status: Former     Current packs/day: 0.00     Types: Cigarettes, Hookah     Quit date: 2011     Years since quittin.6    Smokeless tobacco: Never    Tobacco comments:     Occ vape with THC   Vaping Use    Vaping status: Every Day   Substance Use Topics    Alcohol use: Yes     Comment: rarely    Drug use: Yes     Types: Marijuana       Mammogram Screening - Patient under 40 years of age: Routine Mammogram Screening not recommended.           2025   One time HIV Screening   Previous HIV test? No          "4/16/2025   STI Screening   New sexual partner(s) since last STI/HIV test? No     History of abnormal Pap smear: No - age 30- 64 PAP with HPV every 5 years recommended        Latest Ref Rng & Units 3/5/2024    11:45 AM   PAP / HPV   PAP  Negative for Intraepithelial Lesion or Malignancy (NILM)    HPV 16 DNA Negative Negative    HPV 18 DNA Negative Negative    Other HR HPV Negative Negative            4/16/2025   Contraception/Family Planning   Questions about contraception or family planning No     Reviewed and updated as needed this visit by Provider   Tobacco      Surg Hx  Fam Hx  Soc Hx Sexual Activity          Review of Systems      Objective    Exam  /72 (BP Location: Right arm, Patient Position: Sitting, Cuff Size: Adult Regular)   Pulse 93   Temp 98.8  F (37.1  C) (Oral)   Resp 16   Ht 1.664 m (5' 5.5\")   Wt 75 kg (165 lb 6.4 oz)   LMP  (LMP Unknown)   SpO2 98%   BMI 27.11 kg/m     Estimated body mass index is 27.11 kg/m  as calculated from the following:    Height as of this encounter: 1.664 m (5' 5.5\").    Weight as of this encounter: 75 kg (165 lb 6.4 oz).    Physical Exam  Constitutional:       General: She is not in acute distress.  HENT:      Right Ear: Tympanic membrane, ear canal and external ear normal.      Left Ear: Tympanic membrane, ear canal and external ear normal.   Eyes:      Extraocular Movements: Extraocular movements intact.      Pupils: Pupils are equal, round, and reactive to light.   Neck:      Thyroid: No thyroid mass, thyromegaly or thyroid tenderness.      Comments: Scar to anterior neck  Cardiovascular:      Rate and Rhythm: Normal rate and regular rhythm.      Pulses:           Radial pulses are 2+ on the right side and 2+ on the left side.      Heart sounds: Normal heart sounds. No murmur heard.  Pulmonary:      Effort: Pulmonary effort is normal. No respiratory distress.      Breath sounds: No wheezing.   Abdominal:      General: Abdomen is flat. Bowel sounds " are normal. There is no distension.      Tenderness: There is abdominal tenderness in the right upper quadrant.   Musculoskeletal:         General: Normal range of motion.      Cervical back: Normal range of motion. No rigidity. Normal range of motion.      Right lower leg: No edema.      Left lower leg: No edema.   Lymphadenopathy:      Cervical: No cervical adenopathy.   Neurological:      General: No focal deficit present.      Mental Status: She is alert.      Cranial Nerves: No cranial nerve deficit.      Sensory: No sensory deficit.      Motor: No weakness.      Gait: Gait normal.   Psychiatric:         Mood and Affect: Mood normal.         Thought Content: Thought content normal.               Signed Electronically by: NHI Leija CNP  {Email feedback regarding this note to [email or web address]   :[id number]}

## 2025-04-16 NOTE — Clinical Note
2025    Kezia Loera   1995        To Whom it May Concern;    Please excuse Kezia Loera from work/school for a healthcare visit on 2025.    Sincerely,        Beena Morrissey, NHI CNP

## 2025-04-17 LAB
TTG IGA SER-ACNC: <0.2 U/ML
TTG IGG SER-ACNC: 0.7 U/ML

## 2025-04-17 PROCEDURE — 83993 ASSAY FOR CALPROTECTIN FECAL: CPT

## 2025-04-18 LAB — CALPROTECTIN STL-MCNT: <5 MG/KG (ref 0–49.9)

## 2025-06-02 ENCOUNTER — VIRTUAL VISIT (OUTPATIENT)
Dept: FAMILY MEDICINE | Facility: CLINIC | Age: 30
End: 2025-06-02
Payer: COMMERCIAL

## 2025-06-02 DIAGNOSIS — M89.9 BONE LESION: ICD-10-CM

## 2025-06-02 DIAGNOSIS — R07.89 PAIN OF STERNUM: ICD-10-CM

## 2025-06-02 DIAGNOSIS — K21.9 GASTROESOPHAGEAL REFLUX DISEASE WITHOUT ESOPHAGITIS: Primary | ICD-10-CM

## 2025-06-02 DIAGNOSIS — R10.84 ABDOMINAL PAIN, GENERALIZED: ICD-10-CM

## 2025-06-02 PROCEDURE — 98005 SYNCH AUDIO-VIDEO EST LOW 20: CPT

## 2025-06-02 RX ORDER — PANTOPRAZOLE SODIUM 20 MG/1
20 TABLET, DELAYED RELEASE ORAL DAILY
Qty: 45 TABLET | Refills: 0 | Status: SHIPPED | OUTPATIENT
Start: 2025-06-02

## 2025-06-02 ASSESSMENT — ANXIETY QUESTIONNAIRES
6. BECOMING EASILY ANNOYED OR IRRITABLE: NOT AT ALL
1. FEELING NERVOUS, ANXIOUS, OR ON EDGE: SEVERAL DAYS
7. FEELING AFRAID AS IF SOMETHING AWFUL MIGHT HAPPEN: NOT AT ALL
GAD7 TOTAL SCORE: 1
IF YOU CHECKED OFF ANY PROBLEMS ON THIS QUESTIONNAIRE, HOW DIFFICULT HAVE THESE PROBLEMS MADE IT FOR YOU TO DO YOUR WORK, TAKE CARE OF THINGS AT HOME, OR GET ALONG WITH OTHER PEOPLE: NOT DIFFICULT AT ALL
2. NOT BEING ABLE TO STOP OR CONTROL WORRYING: NOT AT ALL
5. BEING SO RESTLESS THAT IT IS HARD TO SIT STILL: NOT AT ALL
3. WORRYING TOO MUCH ABOUT DIFFERENT THINGS: NOT AT ALL
7. FEELING AFRAID AS IF SOMETHING AWFUL MIGHT HAPPEN: NOT AT ALL
GAD7 TOTAL SCORE: 1
4. TROUBLE RELAXING: NOT AT ALL
8. IF YOU CHECKED OFF ANY PROBLEMS, HOW DIFFICULT HAVE THESE MADE IT FOR YOU TO DO YOUR WORK, TAKE CARE OF THINGS AT HOME, OR GET ALONG WITH OTHER PEOPLE?: NOT DIFFICULT AT ALL
GAD7 TOTAL SCORE: 1

## 2025-06-02 ASSESSMENT — PATIENT HEALTH QUESTIONNAIRE - PHQ9
SUM OF ALL RESPONSES TO PHQ QUESTIONS 1-9: 1
SUM OF ALL RESPONSES TO PHQ QUESTIONS 1-9: 1
10. IF YOU CHECKED OFF ANY PROBLEMS, HOW DIFFICULT HAVE THESE PROBLEMS MADE IT FOR YOU TO DO YOUR WORK, TAKE CARE OF THINGS AT HOME, OR GET ALONG WITH OTHER PEOPLE: NOT DIFFICULT AT ALL

## 2025-06-02 NOTE — PROGRESS NOTES
Kezia is a 30 year old who is being evaluated via a billable video visit.    What phone number would you like to be contacted at? [phone]  How would you like to obtain your AVS? MyChart      Assessment & Plan     Gastroesophageal reflux disease without esophagitis  Abdominal pain, generalized  Improved acid reflux with pantoprazole. Some improvement in abdominal pain, but not completely.   - pantoprazole (PROTONIX) 20 MG EC tablet; Take 1 tablet (20 mg) by mouth daily.    Pain of sternum  Still present. Does not sound like typical angina as it is not chest pain present with activity and relieved with rest. Exacerbated when doing yard work, but otherwise intermittent. Made worse with bending forward and laying on side. Did start after episode of acid reflux, but has not improved with Protonix. Recommend ZioPatch to assess for dysrhythmia as etiology. Will get CT chest to assess sternum with CT to assess bone lesions as below.   - CT Chest Abdomen Pelvis w/o & w Contrast; Future  - ZIO PATCH MAIL OUT; Future    Bone lesion  Bone lesions present on previous CT, discussed follow-up with another image to ensure no changes. She would like to complete this. Imaging ordered.   - CT Chest Abdomen Pelvis w/o & w Contrast; Future    Subjective   Kezia is a 30 year old, presenting for the following health issues:  No chief complaint on file.        6/2/2025    12:24 PM   Additional Questions   Roomed by Vee   Accompanied by self         6/2/2025    12:24 PM   Patient Reported Additional Medications   Patient reports taking the following new medications none     History of Present Illness       Reason for visit:  Heartburt/abdominal pain, sternum pain    She eats 2-3 servings of fruits and vegetables daily.She consumes 0 sweetened beverage(s) daily.She exercises with enough effort to increase her heart rate 10 to 19 minutes per day.  She exercises with enough effort to increase her heart rate 5 days per week.   She is  "taking medications regularly.        Medication Followup of Pantoprazole   Taking Medication as prescribed: yes  Side Effects:  None  Medication Helping Symptoms:  yes    Heart burn is gone. Feeling a lot better since starting, some abdominal pain, but somewhat improved.     The sternum pain is still present. With yard work. Not with all physical activity. Not worse when working. This was the first time she noticed it. With the bending over. With bending over it is worse. Can not lay on either side. Feels very tense or inflamed. Was not worse while doing it, it was more after doing the activity at night. The yard work episode was the worst, but it is on-off other times. Doesn't hurt when she pushes on it, but hurts with bending over/rolling on the side.         Objective    Vitals - Patient Reported  Weight (Patient Reported): 165 lb (74.8 kg)  Height (Patient Reported): 5' 5.5\" (166.4 cm)  BMI (Based on Pt Reported Ht/Wt): 27.04      Physical Exam   GENERAL: alert and no distress  EYES: Eyes grossly normal to inspection.  No discharge or erythema, or obvious scleral/conjunctival abnormalities.  RESP: No audible wheeze, cough, or visible cyanosis.    SKIN: Visible skin clear. No significant rash, abnormal pigmentation or lesions.  NEURO: Cranial nerves grossly intact.  Mentation and speech appropriate for age.  PSYCH: Appropriate affect, tone, and pace of words        Video-Visit Details    Type of service:  Video Visit   Originating Location (pt. Location): Home  Distant Location (provider location):  On-site  Platform used for Video Visit: Doximity    At the end of the visit, I confirmed understanding of what was discussed. Kezia has no further questions or concerns that were brought up at this time.      Beena Morrissey DNP, APRN, FNP-C    "

## 2025-06-16 ENCOUNTER — OFFICE VISIT (OUTPATIENT)
Dept: FAMILY MEDICINE | Facility: CLINIC | Age: 30
End: 2025-06-16
Payer: COMMERCIAL

## 2025-06-16 VITALS
HEART RATE: 85 BPM | SYSTOLIC BLOOD PRESSURE: 110 MMHG | WEIGHT: 161.13 LBS | OXYGEN SATURATION: 98 % | BODY MASS INDEX: 26.4 KG/M2 | DIASTOLIC BLOOD PRESSURE: 66 MMHG | TEMPERATURE: 98.9 F | RESPIRATION RATE: 18 BRPM

## 2025-06-16 DIAGNOSIS — Z30.017 INSERTION OF IMPLANTABLE SUBDERMAL CONTRACEPTIVE: ICD-10-CM

## 2025-06-16 DIAGNOSIS — Z30.017 NEXPLANON INSERTION: Primary | ICD-10-CM

## 2025-06-16 LAB — HCG UR QL: NEGATIVE

## 2025-06-16 PROCEDURE — 3078F DIAST BP <80 MM HG: CPT

## 2025-06-16 PROCEDURE — 3074F SYST BP LT 130 MM HG: CPT

## 2025-06-16 PROCEDURE — 11981 INSERTION DRUG DLVR IMPLANT: CPT

## 2025-06-16 PROCEDURE — 81025 URINE PREGNANCY TEST: CPT

## 2025-06-16 RX ORDER — LIDOCAINE HYDROCHLORIDE AND EPINEPHRINE 10; 10 MG/ML; UG/ML
5 INJECTION, SOLUTION INFILTRATION; PERINEURAL ONCE
Status: COMPLETED | OUTPATIENT
Start: 2025-06-16 | End: 2025-06-16

## 2025-06-16 RX ADMIN — LIDOCAINE HYDROCHLORIDE AND EPINEPHRINE 5 ML: 10; 10 INJECTION, SOLUTION INFILTRATION; PERINEURAL at 12:16

## 2025-06-16 NOTE — PROGRESS NOTES
{PROVIDER CHARTING PREFERENCE:965143}    Kelby Mast is a 30 year old, presenting for the following health issues:  nexplanon insertion        6/16/2025    11:41 AM   Additional Questions   Roomed by Vee   Accompanied by Self         6/16/2025    11:41 AM   Patient Reported Additional Medications   Patient reports taking the following new medications none     HPI      {MA/LPN/RN Pre-Provider Visit Orders- hCG/UA/Strep (Optional):853610}  Nexplanon Insertion  {additonal problems for provider to add (Optional):188506}    {ROS Picklists (Optional):044279}      Objective    /66 (BP Location: Right arm, Patient Position: Sitting, Cuff Size: Adult Regular)   Pulse 85   Temp 98.9  F (37.2  C) (Oral)   Resp 18   Wt 73.1 kg (161 lb 2 oz)   SpO2 98%   BMI 26.40 kg/m    Body mass index is 26.4 kg/m .  Physical Exam   {Exam List (Optional):978411}    {Diagnostic Test Results (Optional):276596}        Signed Electronically by: NHI Leija CNP  {Email feedback regarding this note to [email or web address]   :[id number]}

## 2025-06-16 NOTE — PROGRESS NOTES
Nexplanon Insertion:    Is a pregnancy test required: Yes.  Was it positive or negative?  Negative  Was a consent obtained?  Yes    Subjective: Kezia Loera is a 30 year old No obstetric history on file. presents for Nexplanon.    Patient has been given the opportunity to ask questions about all forms of birth control, including all options appropriate for Kezia Loera. Discussed that no method of birth control, except abstinence is 100% effective against pregnancy or sexually transmitted infection.     Kezia Loera understands she may have the Nexplanon removed at any time and it should be removed by a health care provider.    The entire insertion procedure was reviewed with the patient, including care after placement.    No LMP recorded. Patient has had an injection. Has current contraception. No allergy to betadine or shellfish. Patient declines STD screening  HCG Qual Urine   Date Value Ref Range Status   01/21/2025 Negative Negative Final     hCG Urine Qualitative   Date Value Ref Range Status   06/16/2025 Negative Negative Final     Comment:     This test is for screening purposes.  Results should be interpreted along with the clinical picture.  Confirmation testing is available if warranted by ordering BPX293, HCG Quantitative Pregnancy.         /66 (BP Location: Right arm, Patient Position: Sitting, Cuff Size: Adult Regular)   Pulse 85   Temp 98.9  F (37.2  C) (Oral)   Resp 18   Wt 73.1 kg (161 lb 2 oz)   SpO2 98%   BMI 26.40 kg/m      PROCEDURE NOTE: -- Nexplanon Insertion    Reason for Insertion: contraception    Patient was placed supine with left arm exposed.  Rosario was made 8-10 cm above medial epicondyle and a guiding rosario 4 cm above the first.  3.5 cm below the sulcus. Arm was prepped with Betadine. Insertion point was anesthetized with 3 mL 1% lidocaine. After stretching the skin with thumb and index finger around the insertion site, skin punctured with the tip of the needle  inserted at 30 degrees and then lowered to horizontal position. The needle was then advanced to its full length. Applicator was then stabilized and slider was unlocked. Slider was pulled back until it stopped and then removed.    Correct placement of the implant was confirmed by palpation in the patient's arm and visualizing the purple top of the obturator.   Bandage and pressure dressing applied to insertion site.    EBL: minimal    Complications: none    ASSESSMENT:     ICD-10-CM    1. Nexplanon insertion  Z30.017 HCG qualitative urine     HCG qualitative urine     etonogestrel (NEXPLANON) subdermal implant 68 mg     INSERTION NON-BIODEGRADABLE DRUG DELIVERY IMPLANT      2. Insertion of implantable subdermal contraceptive  Z30.017 etonogestrel (NEXPLANON) subdermal implant 68 mg     INSERTION NON-BIODEGRADABLE DRUG DELIVERY IMPLANT           PLAN:    Given 's handouts, including when to have Nexplanon removed, list of danger s/sx, side effects and follow up recommended. Encouraged condom use for prevention of STD. Back up contraception advised for 7 days. Advised to call for any fever, for prolonged or severe pain or bleeding, abnormal vaginal dischage. She was advised to use pain medications (ibuprofen) as needed for mild to moderate pain.     NHI Leija CNP

## 2025-06-17 ENCOUNTER — HOSPITAL ENCOUNTER (OUTPATIENT)
Dept: CT IMAGING | Facility: HOSPITAL | Age: 30
Discharge: HOME | End: 2025-06-17
Payer: COMMERCIAL

## 2025-06-17 PROCEDURE — 71260 CT THORAX DX C+: CPT

## 2025-06-17 PROCEDURE — 250N000011 HC RX IP 250 OP 636

## 2025-06-17 PROCEDURE — 250N000009 HC RX 250

## 2025-06-17 RX ORDER — IOPAMIDOL 755 MG/ML
79 INJECTION, SOLUTION INTRAVASCULAR ONCE
Status: COMPLETED | OUTPATIENT
Start: 2025-06-17 | End: 2025-06-17

## 2025-06-17 RX ADMIN — IOPAMIDOL 79 ML: 755 INJECTION, SOLUTION INTRAVENOUS at 13:48

## 2025-06-17 RX ADMIN — SODIUM CHLORIDE 70 ML: 9 INJECTION, SOLUTION INTRAVENOUS at 13:49

## 2025-06-27 ENCOUNTER — RESULTS FOLLOW-UP (OUTPATIENT)
Dept: FAMILY MEDICINE | Facility: CLINIC | Age: 30
End: 2025-06-27

## 2025-06-30 ENCOUNTER — PATIENT OUTREACH (OUTPATIENT)
Dept: CARE COORDINATION | Facility: CLINIC | Age: 30
End: 2025-06-30
Payer: COMMERCIAL

## 2025-07-09 ENCOUNTER — HOSPITAL ENCOUNTER (OUTPATIENT)
Dept: MRI IMAGING | Facility: HOSPITAL | Age: 30
Discharge: HOME | End: 2025-07-09
Payer: COMMERCIAL

## 2025-07-09 DIAGNOSIS — K76.9 LIVER LESION: ICD-10-CM

## 2025-07-09 PROCEDURE — 255N000002 HC RX 255 OP 636

## 2025-07-09 PROCEDURE — A9585 GADOBUTROL INJECTION: HCPCS

## 2025-07-09 PROCEDURE — 74183 MRI ABD W/O CNTR FLWD CNTR: CPT

## 2025-07-09 RX ORDER — GADOBUTROL 604.72 MG/ML
0.1 INJECTION INTRAVENOUS ONCE
Status: COMPLETED | OUTPATIENT
Start: 2025-07-09 | End: 2025-07-09

## 2025-07-09 RX ADMIN — GADOBUTROL 7 ML: 604.72 INJECTION INTRAVENOUS at 13:11

## 2025-07-23 ENCOUNTER — OFFICE VISIT (OUTPATIENT)
Dept: RHEUMATOLOGY | Facility: CLINIC | Age: 30
End: 2025-07-23
Payer: COMMERCIAL

## 2025-07-23 ENCOUNTER — HOSPITAL ENCOUNTER (OUTPATIENT)
Dept: RADIOLOGY | Facility: CLINIC | Age: 30
Discharge: HOME | End: 2025-07-23
Attending: INTERNAL MEDICINE
Payer: COMMERCIAL

## 2025-07-23 ENCOUNTER — LAB (OUTPATIENT)
Dept: LAB | Facility: CLINIC | Age: 30
End: 2025-07-23
Attending: INTERNAL MEDICINE
Payer: COMMERCIAL

## 2025-07-23 VITALS
OXYGEN SATURATION: 97 % | WEIGHT: 163.4 LBS | SYSTOLIC BLOOD PRESSURE: 117 MMHG | BODY MASS INDEX: 26.78 KG/M2 | DIASTOLIC BLOOD PRESSURE: 75 MMHG | HEART RATE: 78 BPM

## 2025-07-23 DIAGNOSIS — M94.0 COSTOCHONDRITIS: ICD-10-CM

## 2025-07-23 DIAGNOSIS — R07.89 STERNAL PAIN: ICD-10-CM

## 2025-07-23 DIAGNOSIS — R07.89 STERNAL PAIN: Primary | ICD-10-CM

## 2025-07-23 LAB
CRP SERPL-MCNC: <3 MG/L
ERYTHROCYTE [SEDIMENTATION RATE] IN BLOOD BY WESTERGREN METHOD: 11 MM/HR (ref 0–20)

## 2025-07-23 PROCEDURE — 3078F DIAST BP <80 MM HG: CPT | Performed by: INTERNAL MEDICINE

## 2025-07-23 PROCEDURE — 81374 HLA I TYPING 1 ANTIGEN LR: CPT

## 2025-07-23 PROCEDURE — 85652 RBC SED RATE AUTOMATED: CPT

## 2025-07-23 PROCEDURE — 99204 OFFICE O/P NEW MOD 45 MIN: CPT | Performed by: INTERNAL MEDICINE

## 2025-07-23 PROCEDURE — 3074F SYST BP LT 130 MM HG: CPT | Performed by: INTERNAL MEDICINE

## 2025-07-23 PROCEDURE — 86140 C-REACTIVE PROTEIN: CPT

## 2025-07-23 PROCEDURE — 72200 X-RAY EXAM SI JOINTS: CPT

## 2025-07-23 PROCEDURE — 36415 COLL VENOUS BLD VENIPUNCTURE: CPT

## 2025-07-23 PROCEDURE — 86235 NUCLEAR ANTIGEN ANTIBODY: CPT

## 2025-07-23 NOTE — PROGRESS NOTES
This document was created using a software with less than 100% fidelity, at times resulting in unintended, even erroneous syntax and grammar.  The reader is advised to keep this under consideration while reviewing, interpreting this note.      Rheumatology Consult Note      Kezia Loera     YOB: 1995 Age: 30 year old    Date of visit: 7/23/25    PCP: Sparkle Morrissey    Chief Complaint   Sternal pain since January 2025 (started after thyroid surgery)          Assessment and Plan   Kezia has noticed some chest wall, sternal discomfort since her thyroid surgery in January.  Possible explanations include costochondritis versus a spondyloarthropathy (spondylitis).  Costochondritis is generally a benign condition for which treatments are mostly symptomatic.  For spondylitis biologic treatments are very effective.  She denies any neck, back pain but occasionally spondylitis and affect the junction between the rib cage, sternum.  We will check HLA-B27 which is positive and more than 90% people with spondylitis.  We also discussed psoriatic spondylitis but she has no personal history of psoriasis or family history of psoriasis so that would be unusual to explain her chest wall and sternal discomfort.    I reviewed her chest CT scan which apart from mild degenerative changes in the sternum does not show any pericardial effusion.    Plan: Check HLA-B27, ESR, CRP, x-rays of the sacroiliac joints ordered.  Check JORY (pericarditis)    Follow-up 2 weeks    CC PCP        HPI   Kezia is a very pleasant 30-year young female seen in consultation for chest wall, sternal discomfort since January 2025.  She underwent a thyroidectomy in January (previously tried methimazole but had GI side effects).  A few weeks afterward she started noticing pain in the sternum and left side of the costochondral junctions.  She also had some heartburn symptoms which responded to gastric remedies.  However the sternal pain persisted,  it is noticed mostly at the sternal manubrial junction and left side of the sternum.  It is not constant, comes and goes.  Usually does noticed in the morning (not every morning) or laying down.  The sternal pain is nonexertional.  She had a CT scan of the chest which is reviewed showing mild degenerative changes in the sternum.  She does not have any history of pleurisy or pericarditis.  Denies any shortness of breath.  The chest CT does not show any hilar or mediastinal lymphadenopathy.  She has some discomfort in the left clavicle, denies any neck or back pain.  She does not have any peripheral joint pain.  She does not have a history of psoriasis (works as a [employer] and familiar with psoriasis).  There are no family members with psoriasis.  No known family history of ankylosing spondylitis.  No family history of lupus.  She has a mild intolerance of nonsteroidals due to which he avoids Motrin.  Takes Tylenol as needed with minimal relief of sternal symptoms.    Past medical history.  Hypothyroidism, thyroidectomy (January 2025)    Social history.  She works as a [employer] (12 years, 4 days a week).  No tobacco, alcohol occasional.               Active Problem List     Patient Active Problem List   Diagnosis    H/O partial thyroidectomy     Past Medical History     Past Medical History:   Diagnosis Date    Allergic rhinosinusitis     Gastroesophageal reflux disease     Subclinical hyperthyroidism 05/01/2024    Thyroid disease 05/01/2023    Toxic thyroid nodule    Thyroid nodule 05/01/2024    toxic adenoma left 4.4 cm     Past Surgical History     Past Surgical History:   Procedure Laterality Date    BIOPSY  5/21/24    Fine needle aspiration on right thyroid nodule    EXCISE NODULE THYROID Left 01/21/2025    Procedure: Left thyroid lobectomy and isthmusectomy;  Surgeon: Dawn Alfred MD;  Location: [facility] OR     Allergy     Allergies   Allergen Reactions    Aspirin Other (See Comments)     stiffiness     Ibuprofen Other (See Comments)     stuffy     Current Medication List     Current Outpatient Medications   Medication Sig Dispense Refill    cetirizine (ZYRTEC) 5 MG tablet Take 10 mg by mouth daily      etonogestrel (NEXPLANON) 68 MG IMPL 1 each (68 mg) by Subdermal route See Admin Instructions.      ondansetron (ZOFRAN ODT) 4 MG ODT tab Take 1 tablet (4 mg) by mouth every 8 hours as needed for nausea. 12 tablet 0    pantoprazole (PROTONIX) 20 MG EC tablet Take 1 tablet (20 mg) by mouth daily. 45 tablet 0     Current Facility-Administered Medications   Medication Dose Route Frequency Provider Last Rate Last Admin    etonogestrel (NEXPLANON) subdermal implant 68 mg  1 each Subdermal See Admin Instructions    68 mg at 06/16/25 1216    sodium chloride (PF) 0.9% PF flush 3 mL  3 mL Intravenous q1 min prn    3 mL at 01/29/25 1149       Current Facility-Administered Medications   Medication Dose Route Frequency Provider Last Rate Last Admin        Family History     Family History   Problem Relation Age of Onset    Brain Cancer Paternal Grandfather     Other Cancer Paternal Grandfather         Brain tumor    Anesthesia Reaction No family hx of     Thrombosis No family hx of     Thyroid Disease No family hx of     Nephrolithiasis No family hx of     Diabetes No family hx of          Physical Exam     COMPREHENSIVE EXAMINATION:  Vitals:    07/23/25 0955   BP: 117/75   BP Location: Right arm   Patient Position: Chair   Cuff Size: Adult Regular   Pulse: 78   SpO2: 97%   Weight: 74.1 kg (163 lb 6.4 oz)   Patient is alert and oriented x 3.    Head/neck: No butterfly rash, no jaundice, no conjunctival pallor, no ocular redness, no facial asymmetry, no enlargement of the major salivary glands, no oral ulcers    Lungs: clear to auscultation, no crackles or wheezing  Chest wall.  There is mild-moderate tenderness over the costal manubrial junction and the costochondral junctions on the left side.  Heart sounds :regular, no  "murmur or rub.    Abdomen: Soft, nontender    Musculoskeletal:    Right hand: No synovitis or tenderness of 1-5 MCP joints, no synovitis or tenderness of 1-5 PIP joints.  No swan-neck or boutonniere deformities    Left hand: No synovitis or tenderness of 1-5 MCP joints, no synovitis or tenderness of 1-5 PIP joints, no swan-neck or boutonniere deformities    Right wrist: no synovitis,no tenderness    Left wrist: No tenderness, no synovitis    Elbows: Full range of motion, no swelling or synovitis    Right shoulder: Normal abduction, internal and external rotation    Left shoulder: Normal abduction, internal and external rotation    Cervical spine: Normal flexion, lateral rotation bilaterally full    Spine: No alignment abnormalities noted    Right hip:Full  Flexion internal and external rotation    Left hip: Full flexion, internal and external rotation    Right knee: no effusion, no redness, full ROM    Left knee: No effusion, no redness, full ROM    Rt ankle: No swelling, redness or tenderness    Left ankle: No swelling, redness or tenderness    Right foot: No swelling, redness or tenderness of the toes/MTPs    Left foot: No swelling, redness or tenderness of the toes/MTP joints            Labs / Imaging (select studies)     No results found for: \"PPDINDURATIO\", \"PPDREDNESS\", \"TBGOLT\", \"RHF\", \"CCPABG\", \"URIC\", \"XF20622\", \"[id number]\", \"ANTIDNA\", \"ANTIDNAINT\", \"CARIA\", \"VZ50749\", \"LC073645\", \"ENASM\", \"ENASCL\", \"JO1\", \"ENASSA\", \"ENASSB\", \"CENTA\", \"O6XSEVU\", \"V4TFRRK\", \"EB7878\"   Hemoglobin   Date Value Ref Range Status   01/29/2025 15.1 11.7 - 15.7 g/dL Final   01/15/2025 14.1 11.7 - 15.7 g/dL Final   05/01/2024 14.2 11.7 - 15.7 g/dL Final     Urea Nitrogen   Date Value Ref Range Status   01/29/2025 9 7 - 30 mg/dL Final   01/15/2025 12.4 6.0 - 20.0 mg/dL Final   05/01/2024 11.0 6.0 - 20.0 mg/dL Final     AST   Date Value Ref Range Status   01/29/2025 24 0 - 45 U/L Final     Albumin   Date Value Ref Range Status "   01/29/2025 4.5 3.4 - 5.0 g/dL Final     Alkaline Phosphatase   Date Value Ref Range Status   01/29/2025 58 40 - 150 U/L Final     ALT   Date Value Ref Range Status   01/29/2025 17 0 - 50 U/L Final          Immunization History     Immunization History   Administered Date(s) Administered    COVID-19 12+ (Pfizer) 03/05/2025    COVID-19 Bivalent 18+ (Moderna) 11/23/2022    COVID-19 Monovalent 18+ (Moderna) 04/06/2021, 05/04/2021, 01/04/2022    HPV Quadrivalent 12/27/2007, 02/27/2008, 07/02/2008    Hepatitis A (Vaqta/Havrix)(Peds 12m-18y) 12/27/2007, 07/02/2008    Hepatitis B, Peds (Engerix-B/Recombivax HB) 06/19/2001, 07/24/2001, 10/30/2001    Influenza (H1N1) 01/07/2010    Influenza (IIV3) PF 09/23/2010, 11/14/2011    Influenza Vaccine >6 months,quad, PF 01/26/2020, 01/04/2022, 11/23/2022    Influenza, Split Virus, Trivalent, Pf (Fluzone\Fluarix) 09/23/2010, 03/05/2025    MMR (MMRII) 07/19/2002    Meningococcal ACWY (Menactra ) 12/27/2007    TD,PF 7+ (Tenivac) 09/15/2005    TDAP (Adacel,Boostrix) 07/17/2022

## 2025-07-24 LAB
ENA SM IGG SER IA-ACNC: <0.7 U/ML
ENA SM IGG SER IA-ACNC: NEGATIVE
ENA SS-A AB SER IA-ACNC: <0.5 U/ML
ENA SS-A AB SER IA-ACNC: NEGATIVE
ENA SS-B IGG SER IA-ACNC: <0.6 U/ML
ENA SS-B IGG SER IA-ACNC: NEGATIVE
U1 SNRNP IGG SER IA-ACNC: <1.1 U/ML
U1 SNRNP IGG SER IA-ACNC: NEGATIVE

## 2025-07-28 LAB
B LOCUS: NORMAL
B27TEST METHOD: NORMAL

## 2025-08-04 ENCOUNTER — VIRTUAL VISIT (OUTPATIENT)
Dept: RHEUMATOLOGY | Facility: CLINIC | Age: 30
End: 2025-08-04
Payer: COMMERCIAL

## 2025-08-04 VITALS — WEIGHT: 163 LBS | BODY MASS INDEX: 27.16 KG/M2 | HEIGHT: 65 IN

## 2025-08-04 DIAGNOSIS — R07.89 STERNAL PAIN: ICD-10-CM

## 2025-08-04 DIAGNOSIS — M94.0 COSTOCHONDRITIS: Primary | ICD-10-CM

## 2025-08-04 DIAGNOSIS — Z71.89 ENCOUNTER TO DISCUSS TREATMENT OPTIONS: ICD-10-CM

## 2025-08-04 PROCEDURE — 98006 SYNCH AUDIO-VIDEO EST MOD 30: CPT | Performed by: INTERNAL MEDICINE

## 2025-08-04 PROCEDURE — 1126F AMNT PAIN NOTED NONE PRSNT: CPT | Mod: 95 | Performed by: INTERNAL MEDICINE

## 2025-08-04 ASSESSMENT — PAIN SCALES - GENERAL: PAINLEVEL_OUTOF10: NO PAIN (0)

## 2025-08-07 ENCOUNTER — PATIENT OUTREACH (OUTPATIENT)
Dept: CARE COORDINATION | Facility: CLINIC | Age: 30
End: 2025-08-07
Payer: COMMERCIAL

## 2025-08-11 ENCOUNTER — PATIENT OUTREACH (OUTPATIENT)
Dept: CARE COORDINATION | Facility: CLINIC | Age: 30
End: 2025-08-11
Payer: COMMERCIAL

## (undated) DEVICE — ESU LIGASURE DISSECTOR EXACT LF2019

## (undated) DEVICE — SPECIMEN CONTAINER W/10% BUFFERED FORMALIN 120ML 591201

## (undated) DEVICE — CLIP HORIZON SM RED WIDE SLOT 001201

## (undated) DEVICE — SU MONOCRYL 5-0 P-3 18" UND Y493G

## (undated) DEVICE — NIM PROBE NS STD INCR PRASS TIP STRL LF DISP 8225825X

## (undated) DEVICE — PACK ENT MINOR CUSTOM ASC

## (undated) DEVICE — LINEN TOWEL PACK X5 5464

## (undated) DEVICE — SU CHROMIC 3-0 SH 27" G122H

## (undated) DEVICE — GOWN LG DISP 9515

## (undated) DEVICE — CLIP HORIZON MED BLUE 002200

## (undated) DEVICE — SUCTION MANIFOLD NEPTUNE 2 SYS 1 PORT 702-025-000

## (undated) DEVICE — SOL NACL 0.9% IRRIG 500ML BOTTLE 2F7123

## (undated) DEVICE — SURGICEL FIBRILLAR HEMOSTAT 2"X4" JJ1962

## (undated) DEVICE — SPONGE KITTNER 30-101

## (undated) DEVICE — PREP PAD ALCOHOL 6818

## (undated) DEVICE — SU DERMABOND ADVANCED .7ML DNX12

## (undated) DEVICE — DRSG TEGADERM 2 3/8X2 3/4" 1624W

## (undated) DEVICE — ESU GROUND PAD ADULT W/CORD E7507

## (undated) DEVICE — SU SILK 2-0 TIE 12X30" A305H

## (undated) DEVICE — GLOVE BIOGEL PI MICRO SZ 6.5 48565

## (undated) DEVICE — TUBE ENDOTRACHEAL NIM TRIVANTAGE 6.0MM 8229706

## (undated) DEVICE — STRAP KNEE/BODY 31143004

## (undated) DEVICE — SU SILK 3-0 TIE 12X30" A304H

## (undated) DEVICE — ESU ELEC BLADE 2.75" COATED/INSULATED E1455

## (undated) DEVICE — PREP CHLORAPREP W/ORANGE TINT 10.5ML 260715

## (undated) RX ORDER — ONDANSETRON 2 MG/ML
INJECTION INTRAMUSCULAR; INTRAVENOUS
Status: DISPENSED
Start: 2025-01-21

## (undated) RX ORDER — ACETAMINOPHEN 325 MG/1
TABLET ORAL
Status: DISPENSED
Start: 2025-01-21

## (undated) RX ORDER — GLYCOPYRROLATE 0.2 MG/ML
INJECTION, SOLUTION INTRAMUSCULAR; INTRAVENOUS
Status: DISPENSED
Start: 2025-01-21

## (undated) RX ORDER — PROPOFOL 10 MG/ML
INJECTION, EMULSION INTRAVENOUS
Status: DISPENSED
Start: 2025-01-21

## (undated) RX ORDER — OXYCODONE HYDROCHLORIDE 5 MG/1
TABLET ORAL
Status: DISPENSED
Start: 2025-01-21

## (undated) RX ORDER — FENTANYL CITRATE 50 UG/ML
INJECTION, SOLUTION INTRAMUSCULAR; INTRAVENOUS
Status: DISPENSED
Start: 2025-01-21

## (undated) RX ORDER — FENTANYL CITRATE-0.9 % NACL/PF 10 MCG/ML
PLASTIC BAG, INJECTION (ML) INTRAVENOUS
Status: DISPENSED
Start: 2025-01-21

## (undated) RX ORDER — REMIFENTANIL HYDROCHLORIDE 1 MG/ML
INJECTION, POWDER, LYOPHILIZED, FOR SOLUTION INTRAVENOUS
Status: DISPENSED
Start: 2025-01-21

## (undated) RX ORDER — DEXAMETHASONE SODIUM PHOSPHATE 10 MG/ML
INJECTION, SOLUTION INTRAMUSCULAR; INTRAVENOUS
Status: DISPENSED
Start: 2025-01-21